# Patient Record
Sex: FEMALE | Race: OTHER | NOT HISPANIC OR LATINO
[De-identification: names, ages, dates, MRNs, and addresses within clinical notes are randomized per-mention and may not be internally consistent; named-entity substitution may affect disease eponyms.]

---

## 2017-01-30 ENCOUNTER — RX RENEWAL (OUTPATIENT)
Age: 42
End: 2017-01-30

## 2017-02-06 ENCOUNTER — RX RENEWAL (OUTPATIENT)
Age: 42
End: 2017-02-06

## 2017-03-20 ENCOUNTER — RX RENEWAL (OUTPATIENT)
Age: 42
End: 2017-03-20

## 2017-05-08 ENCOUNTER — RX RENEWAL (OUTPATIENT)
Age: 42
End: 2017-05-08

## 2017-05-12 ENCOUNTER — EMERGENCY (EMERGENCY)
Facility: HOSPITAL | Age: 42
LOS: 0 days | Discharge: HOME | End: 2017-05-12
Attending: EMERGENCY MEDICINE | Admitting: EMERGENCY MEDICINE
Payer: MEDICAID

## 2017-05-12 VITALS — WEIGHT: 139.99 LBS

## 2017-05-12 VITALS
HEART RATE: 64 BPM | TEMPERATURE: 99 F | OXYGEN SATURATION: 100 % | RESPIRATION RATE: 18 BRPM | SYSTOLIC BLOOD PRESSURE: 107 MMHG | DIASTOLIC BLOOD PRESSURE: 60 MMHG
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
## 2017-05-12 DIAGNOSIS — S93.492A SPRAIN OF OTHER LIGAMENT OF LEFT ANKLE, INITIAL ENCOUNTER: ICD-10-CM

## 2017-05-12 PROCEDURE — 73610 X-RAY EXAM OF ANKLE: CPT | Mod: 26,RT

## 2017-05-12 PROCEDURE — 29505 APPLICATION LONG LEG SPLINT: CPT | Mod: LT

## 2017-05-12 PROCEDURE — 99283 EMERGENCY DEPT VISIT LOW MDM: CPT | Mod: 25

## 2017-05-12 RX ORDER — IBUPROFEN 200 MG
600 TABLET ORAL ONCE
Qty: 0 | Refills: 0 | Status: COMPLETED | OUTPATIENT
Start: 2017-05-12 | End: 2017-05-12

## 2017-05-12 RX ADMIN — Medication 600 MILLIGRAM(S): at 22:19

## 2017-05-12 NOTE — ED ADULT TRIAGE NOTE - CHIEF COMPLAINT QUOTE
Tripped down 3 steps and twisted ankle, states he felt something pop in foot. Pain to left foot and ankle and toes. Denies pain anywhere else. Did not fall or hit head. Took 10mg flexeril PTA.

## 2017-05-12 NOTE — ED STATDOCS - OBJECTIVE STATEMENT
42 y/o female c/o left ankle pain s/p mechanical slip and fall x2 hours ago. Pt states she slipped down 3 steps.

## 2017-05-12 NOTE — ED STATDOCS - NS ED MD SCRIBE ATTENDING SCRIBE SECTIONS
REVIEW OF SYSTEMS/PHYSICAL EXAM/DISPOSITION/RESULTS/PROGRESS NOTE/HISTORY OF PRESENT ILLNESS/PAST MEDICAL/SURGICAL/SOCIAL HISTORY

## 2017-05-12 NOTE — ED STATDOCS - DETAILS:
Attending Contribution to Care: I, Rosario Vásquez, performed the initial face to face bedside interview with this patient regarding history of present illness, review of symptoms and relevant past medical, social and family history.  I completed an independent physical examination.  I was the initial provider who evaluated this patient. I have signed out the follow up of any pending tests (i.e. labs, radiological studies) to the ACP.  I have communicated the patient’s plan of care and disposition with the ACP. I, Rosario Vásquez, performed the initial face to face bedside interview with this patient regarding history of present illness, review of symptoms and relevant past medical, social and family history.  I completed an independent physical examination.  I was the initial provider who evaluated this patient.   The history, relevant review of systems, past medical and surgical history, medical decision making, and physical examination was documented by the scribe in my presence and I attest to the accuracy of the documentation.

## 2017-05-17 ENCOUNTER — APPOINTMENT (OUTPATIENT)
Dept: OBGYN | Facility: CLINIC | Age: 42
End: 2017-05-17

## 2017-05-17 VITALS
WEIGHT: 125 LBS | SYSTOLIC BLOOD PRESSURE: 110 MMHG | BODY MASS INDEX: 20.83 KG/M2 | DIASTOLIC BLOOD PRESSURE: 60 MMHG | HEIGHT: 65 IN

## 2017-05-19 LAB — HPV HIGH+LOW RISK DNA PNL CVX: NEGATIVE

## 2017-05-20 LAB — CYTOLOGY CVX/VAG DOC THIN PREP: NORMAL

## 2017-05-24 ENCOUNTER — FORM ENCOUNTER (OUTPATIENT)
Age: 42
End: 2017-05-24

## 2017-05-25 ENCOUNTER — APPOINTMENT (OUTPATIENT)
Dept: MAMMOGRAPHY | Facility: CLINIC | Age: 42
End: 2017-05-25

## 2017-05-25 ENCOUNTER — OUTPATIENT (OUTPATIENT)
Dept: OUTPATIENT SERVICES | Facility: HOSPITAL | Age: 42
LOS: 1 days | End: 2017-05-25
Payer: MEDICAID

## 2017-05-25 DIAGNOSIS — Z00.8 ENCOUNTER FOR OTHER GENERAL EXAMINATION: ICD-10-CM

## 2017-05-25 PROCEDURE — 77063 BREAST TOMOSYNTHESIS BI: CPT

## 2017-05-25 PROCEDURE — 77067 SCR MAMMO BI INCL CAD: CPT

## 2017-06-05 ENCOUNTER — RESULT REVIEW (OUTPATIENT)
Age: 42
End: 2017-06-05

## 2017-09-28 ENCOUNTER — APPOINTMENT (OUTPATIENT)
Dept: OBGYN | Facility: CLINIC | Age: 42
End: 2017-09-28
Payer: MEDICAID

## 2017-09-28 VITALS
DIASTOLIC BLOOD PRESSURE: 60 MMHG | BODY MASS INDEX: 22.68 KG/M2 | WEIGHT: 128 LBS | HEIGHT: 63 IN | SYSTOLIC BLOOD PRESSURE: 110 MMHG

## 2017-09-28 DIAGNOSIS — N76.2 ACUTE VAGINITIS: ICD-10-CM

## 2017-09-28 DIAGNOSIS — N76.0 ACUTE VAGINITIS: ICD-10-CM

## 2017-09-28 PROCEDURE — 99213 OFFICE O/P EST LOW 20 MIN: CPT

## 2017-09-28 PROCEDURE — 81003 URINALYSIS AUTO W/O SCOPE: CPT | Mod: QW

## 2017-09-28 RX ORDER — ERYTHROMYCIN AND BENZOYL PEROXIDE 3 %-5 %
5-3 KIT TOPICAL DAILY
Qty: 50 | Refills: 4 | Status: DISCONTINUED | COMMUNITY
Start: 2017-05-08 | End: 2017-09-28

## 2017-10-02 ENCOUNTER — APPOINTMENT (OUTPATIENT)
Dept: OBGYN | Facility: CLINIC | Age: 42
End: 2017-10-02
Payer: MEDICAID

## 2017-10-02 VITALS
DIASTOLIC BLOOD PRESSURE: 70 MMHG | HEIGHT: 63 IN | SYSTOLIC BLOOD PRESSURE: 118 MMHG | WEIGHT: 128 LBS | BODY MASS INDEX: 22.68 KG/M2

## 2017-10-02 DIAGNOSIS — Z87.440 PERSONAL HISTORY OF URINARY (TRACT) INFECTIONS: ICD-10-CM

## 2017-10-02 LAB
CANDIDA VAG CYTO: NOT DETECTED
G VAGINALIS+PREV SP MTYP VAG QL MICRO: NOT DETECTED
T VAGINALIS VAG QL WET PREP: NOT DETECTED

## 2017-10-02 PROCEDURE — 81003 URINALYSIS AUTO W/O SCOPE: CPT | Mod: QW

## 2017-10-02 PROCEDURE — 99213 OFFICE O/P EST LOW 20 MIN: CPT

## 2017-10-03 ENCOUNTER — APPOINTMENT (OUTPATIENT)
Dept: OBGYN | Facility: CLINIC | Age: 42
End: 2017-10-03

## 2017-10-03 LAB
APPEARANCE: CLEAR
BILIRUBIN URINE: NEGATIVE
BLOOD URINE: NEGATIVE
COLOR: YELLOW
GLUCOSE QUALITATIVE U: NORMAL MG/DL
KETONES URINE: NEGATIVE
LEUKOCYTE ESTERASE URINE: NEGATIVE
NITRITE URINE: NEGATIVE
PH URINE: 6.5
PROTEIN URINE: NEGATIVE MG/DL
SPECIFIC GRAVITY URINE: 1.01
UROBILINOGEN URINE: NORMAL MG/DL

## 2017-10-04 LAB — BACTERIA UR CULT: NORMAL

## 2017-10-10 ENCOUNTER — APPOINTMENT (OUTPATIENT)
Dept: OBGYN | Facility: CLINIC | Age: 42
End: 2017-10-10
Payer: MEDICAID

## 2017-10-10 VITALS
WEIGHT: 122 LBS | SYSTOLIC BLOOD PRESSURE: 115 MMHG | DIASTOLIC BLOOD PRESSURE: 70 MMHG | BODY MASS INDEX: 21.62 KG/M2 | HEIGHT: 63 IN

## 2017-10-10 PROCEDURE — 99214 OFFICE O/P EST MOD 30 MIN: CPT

## 2017-10-11 ENCOUNTER — RX RENEWAL (OUTPATIENT)
Age: 42
End: 2017-10-11

## 2017-10-11 ENCOUNTER — MESSAGE (OUTPATIENT)
Age: 42
End: 2017-10-11

## 2017-10-11 RX ORDER — NYSTATIN AND TRIAMCINOLONE ACETONIDE 100000; 1 [USP'U]/G; MG/G
100000-0.1 OINTMENT TOPICAL 3 TIMES DAILY
Qty: 21 | Refills: 1 | Status: DISCONTINUED | COMMUNITY
Start: 2017-10-10 | End: 2017-10-11

## 2017-10-11 RX ORDER — TERCONAZOLE 80 MG/1
80 SUPPOSITORY VAGINAL
Qty: 3 | Refills: 0 | Status: DISCONTINUED | COMMUNITY
Start: 2017-10-10 | End: 2017-10-11

## 2017-10-20 ENCOUNTER — APPOINTMENT (OUTPATIENT)
Dept: DERMATOLOGY | Facility: CLINIC | Age: 42
End: 2017-10-20
Payer: MEDICAID

## 2017-10-20 VITALS — BODY MASS INDEX: 21.62 KG/M2 | HEIGHT: 63 IN | WEIGHT: 122 LBS

## 2017-10-20 PROCEDURE — 99213 OFFICE O/P EST LOW 20 MIN: CPT

## 2017-10-20 RX ORDER — CLINDAMYCIN PHOSPHATE 1 G/10ML
1 GEL TOPICAL DAILY
Qty: 30 | Refills: 0 | Status: DISCONTINUED | COMMUNITY
Start: 2017-03-15 | End: 2017-10-20

## 2017-10-20 RX ORDER — NYSTATIN 100000 [USP'U]/G
100000 CREAM TOPICAL TWICE DAILY
Qty: 15 | Refills: 0 | Status: DISCONTINUED | COMMUNITY
Start: 2017-10-11 | End: 2017-10-20

## 2017-10-20 RX ORDER — FLUCONAZOLE 150 MG/1
150 TABLET ORAL
Qty: 1 | Refills: 0 | Status: DISCONTINUED | COMMUNITY
Start: 2017-09-28 | End: 2017-10-20

## 2017-10-20 RX ORDER — TRIAMCINOLONE ACETONIDE 1 MG/G
0.1 CREAM TOPICAL
Qty: 15 | Refills: 0 | Status: DISCONTINUED | COMMUNITY
Start: 2017-10-11 | End: 2017-10-20

## 2017-10-20 RX ORDER — TERCONAZOLE 8 MG/G
0.8 CREAM VAGINAL
Qty: 15 | Refills: 0 | Status: DISCONTINUED | COMMUNITY
Start: 2017-10-11 | End: 2017-10-20

## 2017-11-28 ENCOUNTER — TRANSCRIPTION ENCOUNTER (OUTPATIENT)
Age: 42
End: 2017-11-28

## 2017-12-15 ENCOUNTER — APPOINTMENT (OUTPATIENT)
Dept: DERMATOLOGY | Facility: CLINIC | Age: 42
End: 2017-12-15
Payer: MEDICAID

## 2017-12-15 PROCEDURE — 99213 OFFICE O/P EST LOW 20 MIN: CPT

## 2017-12-15 RX ORDER — SPIRONOLACTONE 50 MG/1
50 TABLET ORAL
Qty: 60 | Refills: 3 | Status: DISCONTINUED | COMMUNITY
Start: 2017-10-20 | End: 2017-12-15

## 2017-12-15 RX ORDER — CLOTRIMAZOLE 2 %
2 CREAM WITH APPLICATOR VAGINAL
Qty: 22 | Refills: 0 | Status: DISCONTINUED | COMMUNITY
Start: 2017-09-30

## 2017-12-15 RX ORDER — SULFAMETHOXAZOLE AND TRIMETHOPRIM 800; 160 MG/1; MG/1
800-160 TABLET ORAL
Qty: 6 | Refills: 0 | Status: DISCONTINUED | COMMUNITY
Start: 2017-10-02

## 2017-12-15 RX ORDER — KETOTIFEN FUMARATE 0.35 MG/ML
0.03 SOLUTION/ DROPS OPHTHALMIC
Qty: 5 | Refills: 0 | Status: ACTIVE | COMMUNITY
Start: 2017-06-26

## 2017-12-31 ENCOUNTER — EMERGENCY (EMERGENCY)
Facility: HOSPITAL | Age: 42
LOS: 0 days | Discharge: HOME | End: 2017-12-31
Attending: EMERGENCY MEDICINE | Admitting: EMERGENCY MEDICINE
Payer: MEDICAID

## 2017-12-31 VITALS
RESPIRATION RATE: 17 BRPM | SYSTOLIC BLOOD PRESSURE: 119 MMHG | TEMPERATURE: 98 F | DIASTOLIC BLOOD PRESSURE: 64 MMHG | HEART RATE: 70 BPM | OXYGEN SATURATION: 100 %

## 2017-12-31 VITALS — WEIGHT: 119.93 LBS

## 2017-12-31 DIAGNOSIS — S80.821A BLISTER (NONTHERMAL), RIGHT LOWER LEG, INITIAL ENCOUNTER: ICD-10-CM
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
## 2017-12-31 PROCEDURE — 99283 EMERGENCY DEPT VISIT LOW MDM: CPT

## 2017-12-31 RX ORDER — HYDROCORTISONE 1 %
1 OINTMENT (GRAM) TOPICAL
Qty: 1 | Refills: 0 | OUTPATIENT
Start: 2017-12-31 | End: 2018-01-09

## 2017-12-31 NOTE — ED STATDOCS - SKIN, MLM
skin normal color for race, warm, dry and intact, except for 1.5cm diameter circular fluid filled blister, no surrounding erythema, TTP. skin normal color for race, warm, dry and intact, except for 1.5cm diameter circular fluid filled blister, no surrounding erythema, fluctuance, induration, TTP.

## 2017-12-31 NOTE — ED STATDOCS - OBJECTIVE STATEMENT
43 y/o F with a PMhx of Lupus, RA presents to the ED c/o what she believes to be a spider bite to her R posterior calf. Pt states that she first noticed a small red monae that later became a blister. Pt states that she has been taking the same Lupus medication for the past x4 years, but has recently started taking Doxycycline. Pt states that she is scheduled to see her rheumatologist next week after the new year. Pt currently calm and denies fever, cough, chills, NVD, abd pain, CP, SOB or any other acute c/o at this time. 41 y/o F with a PMhx of Lupus, RA presents to the ED c/o what she believes to be a spider bite to her R posterior calf. Pt states that she first noticed a small red monae that later became a blister over the past 3-4 days. Pt states that she has been taking the same Lupus medication for the past x4 years, but has recently started taking Doxycycline for facial acnes. Pt states that she is scheduled to see her rheumatologist next week after the new year. Pt currently calm and denies fever, cough, chills, NVD, abd pain, CP, SOB or any other acute c/o at this time. No actual bites, drainage, trauma. No pain or itching at site. No treatment attempted pta.

## 2017-12-31 NOTE — ED ADULT TRIAGE NOTE - CHIEF COMPLAINT QUOTE
pt states she believes she had a spider btie to her right ankle. pt states that 3 days ago she noticed an a blister on her ankle,. with reddness around the area wotj itching and pain. pt states she also has muscle aching the the area and a headache. pt denies fever

## 2017-12-31 NOTE — ED STATDOCS - MEDICAL DECISION MAKING DETAILS
worsening blister to R posterior calf with plans to receive topical cortisone cream worsening blister to R posterior calf with plans to receive topical cortisone cream. no signs of infection. possible related to pts autoimmune disease

## 2017-12-31 NOTE — ED STATDOCS - PROGRESS NOTE DETAILS
42 yr. old female PMH: Lupus, RA presents to ED with blister on right lower leg posterior  aspect calf. No fever or chills no pruritis. No fever or chills. Seen and examined by attending in Intake. Plan: Hydrocortisone ointment. Will F/U with results and re evaluate. Yesenia TOLENTINO

## 2018-01-10 ENCOUNTER — APPOINTMENT (OUTPATIENT)
Dept: OBGYN | Facility: CLINIC | Age: 43
End: 2018-01-10
Payer: MEDICAID

## 2018-01-10 VITALS
RESPIRATION RATE: 16 BRPM | SYSTOLIC BLOOD PRESSURE: 110 MMHG | HEART RATE: 70 BPM | DIASTOLIC BLOOD PRESSURE: 70 MMHG | BODY MASS INDEX: 21.62 KG/M2 | HEIGHT: 63 IN | WEIGHT: 122 LBS

## 2018-01-10 VITALS — TEMPERATURE: 97.5 F

## 2018-01-10 LAB
BILIRUB UR QL STRIP: NEGATIVE
CLARITY UR: CLEAR
COLLECTION METHOD: NORMAL
GLUCOSE UR-MCNC: NEGATIVE
HCG UR QL: 0.2 EU/DL
HGB UR QL STRIP.AUTO: NEGATIVE
KETONES UR-MCNC: NEGATIVE
LEUKOCYTE ESTERASE UR QL STRIP: NEGATIVE
NITRITE UR QL STRIP: NEGATIVE
PH UR STRIP: 6
PROT UR STRIP-MCNC: NEGATIVE
SP GR UR STRIP: 1.01

## 2018-01-10 PROCEDURE — 99213 OFFICE O/P EST LOW 20 MIN: CPT

## 2018-01-10 PROCEDURE — 83986 ASSAY PH BODY FLUID NOS: CPT | Mod: QW

## 2018-01-10 PROCEDURE — 82120 AMINES VAGINAL FLUID QUAL: CPT | Mod: QW

## 2018-01-10 PROCEDURE — 81003 URINALYSIS AUTO W/O SCOPE: CPT | Mod: QW

## 2018-01-11 LAB
APPEARANCE: CLEAR
BACTERIA: NEGATIVE
BILIRUBIN URINE: NEGATIVE
BLOOD URINE: NEGATIVE
C TRACH RRNA SPEC QL NAA+PROBE: NOT DETECTED
CANDIDA VAG CYTO: NOT DETECTED
COLOR: YELLOW
G VAGINALIS+PREV SP MTYP VAG QL MICRO: NOT DETECTED
GLUCOSE QUALITATIVE U: NEGATIVE MG/DL
KETONES URINE: NEGATIVE
LEUKOCYTE ESTERASE URINE: NEGATIVE
MICROSCOPIC-UA: NORMAL
N GONORRHOEA RRNA SPEC QL NAA+PROBE: NOT DETECTED
NITRITE URINE: NEGATIVE
PH URINE: 6
PROTEIN URINE: NEGATIVE MG/DL
RED BLOOD CELLS URINE: 1 /HPF
SOURCE AMPLIFICATION: NORMAL
SPECIFIC GRAVITY URINE: 1.01
SQUAMOUS EPITHELIAL CELLS: 0 /HPF
T VAGINALIS VAG QL WET PREP: NOT DETECTED
UROBILINOGEN URINE: NEGATIVE MG/DL
WHITE BLOOD CELLS URINE: 0 /HPF

## 2018-01-15 ENCOUNTER — CLINICAL ADVICE (OUTPATIENT)
Age: 43
End: 2018-01-15

## 2018-01-15 DIAGNOSIS — R10.2 PELVIC AND PERINEAL PAIN: ICD-10-CM

## 2018-01-15 DIAGNOSIS — G89.29 PELVIC AND PERINEAL PAIN: ICD-10-CM

## 2018-01-15 LAB
BACTERIA GENITAL AEROBE CULT: NORMAL
BACTERIA UR CULT: NORMAL

## 2018-01-16 ENCOUNTER — RESULT REVIEW (OUTPATIENT)
Age: 43
End: 2018-01-16

## 2018-03-09 ENCOUNTER — APPOINTMENT (OUTPATIENT)
Dept: OBGYN | Facility: CLINIC | Age: 43
End: 2018-03-09

## 2018-03-16 ENCOUNTER — APPOINTMENT (OUTPATIENT)
Dept: DERMATOLOGY | Facility: CLINIC | Age: 43
End: 2018-03-16
Payer: MEDICAID

## 2018-03-16 PROCEDURE — 99213 OFFICE O/P EST LOW 20 MIN: CPT

## 2018-03-16 PROCEDURE — 0029D: CPT

## 2018-03-16 RX ORDER — DOXYCYCLINE 100 MG/1
100 CAPSULE ORAL TWICE DAILY
Qty: 1 | Refills: 3 | Status: DISCONTINUED | COMMUNITY
Start: 2017-12-15 | End: 2018-03-16

## 2018-03-16 RX ORDER — METRONIDAZOLE 7.5 MG/G
0.75 GEL VAGINAL
Qty: 15 | Refills: 0 | Status: DISCONTINUED | COMMUNITY
Start: 2018-01-10 | End: 2018-03-16

## 2018-03-16 RX ORDER — CYCLOBENZAPRINE HYDROCHLORIDE 10 MG/1
10 TABLET, FILM COATED ORAL
Qty: 30 | Refills: 0 | Status: DISCONTINUED | COMMUNITY
Start: 2017-08-22 | End: 2018-03-16

## 2018-06-03 ENCOUNTER — FORM ENCOUNTER (OUTPATIENT)
Age: 43
End: 2018-06-03

## 2018-06-04 ENCOUNTER — OUTPATIENT (OUTPATIENT)
Dept: OUTPATIENT SERVICES | Facility: HOSPITAL | Age: 43
LOS: 1 days | End: 2018-06-04
Payer: MEDICAID

## 2018-06-04 ENCOUNTER — APPOINTMENT (OUTPATIENT)
Dept: MAMMOGRAPHY | Facility: CLINIC | Age: 43
End: 2018-06-04
Payer: MEDICAID

## 2018-06-04 DIAGNOSIS — Z00.8 ENCOUNTER FOR OTHER GENERAL EXAMINATION: ICD-10-CM

## 2018-06-04 PROCEDURE — 77063 BREAST TOMOSYNTHESIS BI: CPT | Mod: 26

## 2018-06-04 PROCEDURE — 77063 BREAST TOMOSYNTHESIS BI: CPT

## 2018-06-04 PROCEDURE — 77067 SCR MAMMO BI INCL CAD: CPT | Mod: 26

## 2018-06-04 PROCEDURE — 77067 SCR MAMMO BI INCL CAD: CPT

## 2018-06-25 ENCOUNTER — APPOINTMENT (OUTPATIENT)
Dept: DERMATOLOGY | Facility: CLINIC | Age: 43
End: 2018-06-25
Payer: MEDICAID

## 2018-06-25 PROCEDURE — 99213 OFFICE O/P EST LOW 20 MIN: CPT

## 2018-06-25 RX ORDER — LIDOCAINE 5% 700 MG/1
5 PATCH TOPICAL
Qty: 30 | Refills: 0 | Status: ACTIVE | COMMUNITY
Start: 2018-05-31

## 2018-06-25 RX ORDER — HYDROCORTISONE 1 G/100G
1 GEL TOPICAL
Refills: 0 | Status: DISCONTINUED | COMMUNITY
End: 2018-06-25

## 2018-09-11 ENCOUNTER — RX RENEWAL (OUTPATIENT)
Age: 43
End: 2018-09-11

## 2018-09-17 ENCOUNTER — RX RENEWAL (OUTPATIENT)
Age: 43
End: 2018-09-17

## 2018-09-24 ENCOUNTER — APPOINTMENT (OUTPATIENT)
Dept: OBGYN | Facility: CLINIC | Age: 43
End: 2018-09-24
Payer: MEDICAID

## 2018-09-24 VITALS
SYSTOLIC BLOOD PRESSURE: 100 MMHG | HEIGHT: 63 IN | DIASTOLIC BLOOD PRESSURE: 60 MMHG | BODY MASS INDEX: 22.68 KG/M2 | WEIGHT: 128 LBS

## 2018-09-24 DIAGNOSIS — Z01.419 ENCOUNTER FOR GYNECOLOGICAL EXAMINATION (GENERAL) (ROUTINE) W/OUT ABNORMAL FINDINGS: ICD-10-CM

## 2018-09-24 PROCEDURE — 82270 OCCULT BLOOD FECES: CPT

## 2018-09-24 PROCEDURE — 99396 PREV VISIT EST AGE 40-64: CPT

## 2018-09-25 PROBLEM — Z01.419 WOMEN'S ANNUAL ROUTINE GYNECOLOGICAL EXAMINATION: Status: RESOLVED | Noted: 2018-09-24 | Resolved: 2018-09-25

## 2018-10-19 LAB
C TRACH RRNA SPEC QL NAA+PROBE: NOT DETECTED
CYTOLOGY CVX/VAG DOC THIN PREP: NORMAL
HPV HIGH+LOW RISK DNA PNL CVX: NOT DETECTED
N GONORRHOEA RRNA SPEC QL NAA+PROBE: NOT DETECTED
SOURCE TP AMPLIFICATION: NORMAL

## 2018-11-26 ENCOUNTER — APPOINTMENT (OUTPATIENT)
Dept: DERMATOLOGY | Facility: CLINIC | Age: 43
End: 2018-11-26
Payer: MEDICAID

## 2018-11-26 VITALS — HEIGHT: 63 IN | WEIGHT: 126 LBS | BODY MASS INDEX: 22.32 KG/M2

## 2018-11-26 PROCEDURE — 99213 OFFICE O/P EST LOW 20 MIN: CPT

## 2018-11-26 RX ORDER — CYCLOSPORINE 0.5 MG/ML
0.05 EMULSION OPHTHALMIC
Qty: 60 | Refills: 0 | Status: DISCONTINUED | COMMUNITY
Start: 2017-03-11 | End: 2018-11-26

## 2018-11-26 RX ORDER — ADHESIVE TAPE 3"X 2.3 YD
50 MCG TAPE, NON-MEDICATED TOPICAL
Refills: 0 | Status: ACTIVE | COMMUNITY

## 2018-11-26 RX ORDER — ACETAMINOPHEN 325 MG
TABLET ORAL
Refills: 0 | Status: ACTIVE | COMMUNITY

## 2018-11-26 RX ORDER — CYCLOBENZAPRINE HYDROCHLORIDE 5 MG/1
5 TABLET, FILM COATED ORAL
Qty: 30 | Refills: 0 | Status: DISCONTINUED | COMMUNITY
Start: 2017-07-10 | End: 2018-11-26

## 2018-11-26 RX ORDER — HYDROCORTISONE AND ACETIC ACID OTIC 20.75; 10.375 MG/ML; MG/ML
1-2 SOLUTION AURICULAR (OTIC)
Qty: 10 | Refills: 0 | Status: DISCONTINUED | COMMUNITY
Start: 2018-03-08 | End: 2018-11-26

## 2018-11-27 PROBLEM — L93.0 DISCOID LUPUS ERYTHEMATOSUS: Chronic | Status: ACTIVE | Noted: 2017-12-31

## 2018-11-27 PROBLEM — M06.9 RHEUMATOID ARTHRITIS, UNSPECIFIED: Chronic | Status: ACTIVE | Noted: 2017-12-31

## 2018-12-12 RX ORDER — DAPSONE 50 MG/G
5 GEL TOPICAL
Qty: 1 | Refills: 2 | Status: DISCONTINUED | COMMUNITY
Start: 2018-11-26 | End: 2018-12-12

## 2019-01-06 ENCOUNTER — EMERGENCY (EMERGENCY)
Facility: HOSPITAL | Age: 44
LOS: 0 days | Discharge: HOME | End: 2019-01-06
Attending: EMERGENCY MEDICINE | Admitting: EMERGENCY MEDICINE
Payer: MEDICAID

## 2019-01-06 VITALS
OXYGEN SATURATION: 100 % | RESPIRATION RATE: 18 BRPM | HEART RATE: 82 BPM | SYSTOLIC BLOOD PRESSURE: 124 MMHG | TEMPERATURE: 98 F | DIASTOLIC BLOOD PRESSURE: 74 MMHG

## 2019-01-06 VITALS — WEIGHT: 126.1 LBS | HEIGHT: 63 IN

## 2019-01-06 LAB
ALBUMIN SERPL ELPH-MCNC: 4 G/DL — SIGNIFICANT CHANGE UP (ref 3.3–5)
ALP SERPL-CCNC: 56 U/L — SIGNIFICANT CHANGE UP (ref 40–120)
ALT FLD-CCNC: 29 U/L — SIGNIFICANT CHANGE UP (ref 12–78)
ANION GAP SERPL CALC-SCNC: 6 MMOL/L — SIGNIFICANT CHANGE UP (ref 5–17)
APPEARANCE UR: CLEAR — SIGNIFICANT CHANGE UP
AST SERPL-CCNC: 12 U/L — LOW (ref 15–37)
BACTERIA # UR AUTO: ABNORMAL
BASOPHILS # BLD AUTO: 0.04 K/UL — SIGNIFICANT CHANGE UP (ref 0–0.2)
BASOPHILS NFR BLD AUTO: 0.7 % — SIGNIFICANT CHANGE UP (ref 0–2)
BILIRUB SERPL-MCNC: 0.5 MG/DL — SIGNIFICANT CHANGE UP (ref 0.2–1.2)
BILIRUB UR-MCNC: NEGATIVE — SIGNIFICANT CHANGE UP
BUN SERPL-MCNC: 18 MG/DL — SIGNIFICANT CHANGE UP (ref 7–23)
CALCIUM SERPL-MCNC: 8.7 MG/DL — SIGNIFICANT CHANGE UP (ref 8.5–10.1)
CHLORIDE SERPL-SCNC: 109 MMOL/L — HIGH (ref 96–108)
CO2 SERPL-SCNC: 27 MMOL/L — SIGNIFICANT CHANGE UP (ref 22–31)
COLOR SPEC: YELLOW — SIGNIFICANT CHANGE UP
CREAT SERPL-MCNC: 0.72 MG/DL — SIGNIFICANT CHANGE UP (ref 0.5–1.3)
DIFF PNL FLD: NEGATIVE — SIGNIFICANT CHANGE UP
EOSINOPHIL # BLD AUTO: 0.11 K/UL — SIGNIFICANT CHANGE UP (ref 0–0.5)
EOSINOPHIL NFR BLD AUTO: 2 % — SIGNIFICANT CHANGE UP (ref 0–6)
EPI CELLS # UR: SIGNIFICANT CHANGE UP
GLUCOSE SERPL-MCNC: 86 MG/DL — SIGNIFICANT CHANGE UP (ref 70–99)
GLUCOSE UR QL: NEGATIVE MG/DL — SIGNIFICANT CHANGE UP
HCT VFR BLD CALC: 39.3 % — SIGNIFICANT CHANGE UP (ref 34.5–45)
HGB BLD-MCNC: 13.4 G/DL — SIGNIFICANT CHANGE UP (ref 11.5–15.5)
IMM GRANULOCYTES NFR BLD AUTO: 0.2 % — SIGNIFICANT CHANGE UP (ref 0–1.5)
KETONES UR-MCNC: NEGATIVE — SIGNIFICANT CHANGE UP
LEUKOCYTE ESTERASE UR-ACNC: ABNORMAL
LIDOCAIN IGE QN: 136 U/L — SIGNIFICANT CHANGE UP (ref 73–393)
LYMPHOCYTES # BLD AUTO: 1.38 K/UL — SIGNIFICANT CHANGE UP (ref 1–3.3)
LYMPHOCYTES # BLD AUTO: 24.6 % — SIGNIFICANT CHANGE UP (ref 13–44)
MAGNESIUM SERPL-MCNC: 2.1 MG/DL — SIGNIFICANT CHANGE UP (ref 1.6–2.6)
MCHC RBC-ENTMCNC: 31.5 PG — SIGNIFICANT CHANGE UP (ref 27–34)
MCHC RBC-ENTMCNC: 34.1 GM/DL — SIGNIFICANT CHANGE UP (ref 32–36)
MCV RBC AUTO: 92.5 FL — SIGNIFICANT CHANGE UP (ref 80–100)
MONOCYTES # BLD AUTO: 0.41 K/UL — SIGNIFICANT CHANGE UP (ref 0–0.9)
MONOCYTES NFR BLD AUTO: 7.3 % — SIGNIFICANT CHANGE UP (ref 2–14)
NEUTROPHILS # BLD AUTO: 3.65 K/UL — SIGNIFICANT CHANGE UP (ref 1.8–7.4)
NEUTROPHILS NFR BLD AUTO: 65.2 % — SIGNIFICANT CHANGE UP (ref 43–77)
NITRITE UR-MCNC: NEGATIVE — SIGNIFICANT CHANGE UP
NRBC # BLD: 0 /100 WBCS — SIGNIFICANT CHANGE UP (ref 0–0)
PH UR: 5 — SIGNIFICANT CHANGE UP (ref 5–8)
PHOSPHATE SERPL-MCNC: 3.4 MG/DL — SIGNIFICANT CHANGE UP (ref 2.5–4.5)
PLATELET # BLD AUTO: 167 K/UL — SIGNIFICANT CHANGE UP (ref 150–400)
POTASSIUM SERPL-MCNC: 4.2 MMOL/L — SIGNIFICANT CHANGE UP (ref 3.5–5.3)
POTASSIUM SERPL-SCNC: 4.2 MMOL/L — SIGNIFICANT CHANGE UP (ref 3.5–5.3)
PROT SERPL-MCNC: 7.7 GM/DL — SIGNIFICANT CHANGE UP (ref 6–8.3)
PROT UR-MCNC: 15 MG/DL
RBC # BLD: 4.25 M/UL — SIGNIFICANT CHANGE UP (ref 3.8–5.2)
RBC # FLD: 12.1 % — SIGNIFICANT CHANGE UP (ref 10.3–14.5)
RBC CASTS # UR COMP ASSIST: NEGATIVE /HPF — SIGNIFICANT CHANGE UP (ref 0–4)
SODIUM SERPL-SCNC: 142 MMOL/L — SIGNIFICANT CHANGE UP (ref 135–145)
SP GR SPEC: 1.03 — HIGH (ref 1.01–1.02)
TROPONIN I SERPL-MCNC: <0.015 NG/ML — SIGNIFICANT CHANGE UP (ref 0.01–0.04)
TROPONIN I SERPL-MCNC: <0.015 NG/ML — SIGNIFICANT CHANGE UP (ref 0.01–0.04)
UROBILINOGEN FLD QL: NEGATIVE MG/DL — SIGNIFICANT CHANGE UP
WBC # BLD: 5.6 K/UL — SIGNIFICANT CHANGE UP (ref 3.8–10.5)
WBC # FLD AUTO: 5.6 K/UL — SIGNIFICANT CHANGE UP (ref 3.8–10.5)
WBC UR QL: SIGNIFICANT CHANGE UP

## 2019-01-06 PROCEDURE — 93010 ELECTROCARDIOGRAM REPORT: CPT

## 2019-01-06 PROCEDURE — 99284 EMERGENCY DEPT VISIT MOD MDM: CPT

## 2019-01-06 PROCEDURE — 71045 X-RAY EXAM CHEST 1 VIEW: CPT | Mod: 26

## 2019-01-06 PROCEDURE — 76700 US EXAM ABDOM COMPLETE: CPT | Mod: 26

## 2019-01-06 PROCEDURE — 76857 US EXAM PELVIC LIMITED: CPT | Mod: 26

## 2019-01-06 RX ORDER — LIDOCAINE 4 G/100G
5 CREAM TOPICAL ONCE
Qty: 0 | Refills: 0 | Status: COMPLETED | OUTPATIENT
Start: 2019-01-06 | End: 2019-01-06

## 2019-01-06 RX ORDER — SODIUM CHLORIDE 9 MG/ML
1000 INJECTION INTRAMUSCULAR; INTRAVENOUS; SUBCUTANEOUS ONCE
Qty: 0 | Refills: 0 | Status: COMPLETED | OUTPATIENT
Start: 2019-01-06 | End: 2019-01-06

## 2019-01-06 RX ADMIN — SODIUM CHLORIDE 2000 MILLILITER(S): 9 INJECTION INTRAMUSCULAR; INTRAVENOUS; SUBCUTANEOUS at 18:10

## 2019-01-06 RX ADMIN — SODIUM CHLORIDE 1000 MILLILITER(S): 9 INJECTION INTRAMUSCULAR; INTRAVENOUS; SUBCUTANEOUS at 19:10

## 2019-01-06 NOTE — ED STATDOCS - ATTENDING CONTRIBUTION TO CARE
I, Abdiel Ramsey, performed the initial face to face bedside interview with this patient regarding history of present illness, review of symptoms and relevant past medical, social and family history.  I completed an independent physical examination.  I was the initial provider who evaluated this patient. I have signed out the follow up of any pending tests (i.e. labs, radiological studies) to the ACP.  I have communicated the patient’s plan of care and disposition with the ACP.  The history, relevant review of systems, past medical and surgical history, medical decision making, and physical examination was documented by the scribe in my presence and I attest to the accuracy of the documentation.

## 2019-01-06 NOTE — ED STATDOCS - CARE PROVIDER_API CALL
Bear Chapman), Gastroenterology; Internal Medicine  [address] [place] [address]  Phone: [phone]  Fax: [phone]

## 2019-01-06 NOTE — ED STATDOCS - OBJECTIVE STATEMENT
44 y/o female with a PMHx of Lupus on Plaquenil, RA, H. Pylori presents to the ED c/o RUQ abd pain x3-4 days. Pt describes the pain as 8/10 in severity, constant, sharp and radiating to lower abdomen. Denies nausea, vomiting, urinary complaints, bloody stools/melena. Pt has hx of constipation in December 2018, took Miralax with no relief. Pt then tried detox tea which improved her sx, now with normal BMs and gradual onset of abd pain 2 days later. No hx of abd surgeries. Last BM 2 days ago. Pt reports similar pain as previous H. Pylori infection. Pt states she has CP at baseline. LNMP end of Dec 2018. Rheumatologist Dr. Villa. PCP Dr. Chan. GI Dr. Pond

## 2019-01-06 NOTE — ED STATDOCS - PROGRESS NOTE DETAILS
42 y/o F with PMH of lupus, H. pylori presents with RUQ pain x 3-4 days which has been getting progressively worse. States pain is similar to when she had H. pylori in the past. Follows Dr. Devine for GI. Denies nausea, vomiting, diarrhea. Admits to chronic constipation, treated with PO laxatives at [facility]. 44 y/o F with PMH of lupus, H. pylori presents with RUQ pain x 3-4 days which has been getting progressively worse. States pain is similar to when she had H. pylori in the past. Follows Dr. Devine for GI. Denies nausea, vomiting, diarrhea. Admits to chronic constipation, treated with PO laxatives at home. PE: Well appearing. Cardiac: s1/s2, RRR. Lungs: CTAB. Abdomen: NBS x4, soft, nontender. A/P: r/o cholecystitis, pancreatitis. labs, CT abdomen/pelvis, RUQ US. IVF. Pt advised if eval unremarkable to follow up with GI for endoscopy. - Tomas Ferris PA-C Labs and imaging reviewed d/w patient. Plan to check 2nd troponin, will d/c home if normal. - Tomas Ferris PA-C Trop #2 negative. Plan to dc with GI follow up. - Tomas Ferris PA-C

## 2019-01-08 DIAGNOSIS — M32.9 SYSTEMIC LUPUS ERYTHEMATOSUS, UNSPECIFIED: ICD-10-CM

## 2019-01-08 DIAGNOSIS — K29.00 ACUTE GASTRITIS WITHOUT BLEEDING: ICD-10-CM

## 2019-01-08 DIAGNOSIS — R10.11 RIGHT UPPER QUADRANT PAIN: ICD-10-CM

## 2019-01-08 DIAGNOSIS — M06.9 RHEUMATOID ARTHRITIS, UNSPECIFIED: ICD-10-CM

## 2019-03-18 ENCOUNTER — APPOINTMENT (OUTPATIENT)
Dept: DERMATOLOGY | Facility: CLINIC | Age: 44
End: 2019-03-18
Payer: MEDICAID

## 2019-03-18 VITALS — BODY MASS INDEX: 22.32 KG/M2 | HEIGHT: 63 IN | WEIGHT: 126 LBS

## 2019-03-18 PROCEDURE — 99213 OFFICE O/P EST LOW 20 MIN: CPT

## 2019-03-18 NOTE — PHYSICAL EXAM
[FreeTextEntry3] : Skin examination performed of the face, neck, chest, hands, lower legs;\par The patient is well, alert and oriented, pleasant and cooperative.\par Eyelids, conjunctivae, oral mucosa, digits and nails all normal.  \par No cervical adenopathy.\par \par \par acneiform papules on jawline, L > R

## 2019-03-18 NOTE — ASSESSMENT
[FreeTextEntry1] : acne;  cont aczone/tretinoin;\par f/u for TCA q 2-3 months;\par used glyMask, po txs already without good effect;\par \par

## 2019-04-21 ENCOUNTER — EMERGENCY (EMERGENCY)
Facility: HOSPITAL | Age: 44
LOS: 0 days | Discharge: HOME | End: 2019-04-21
Attending: EMERGENCY MEDICINE | Admitting: EMERGENCY MEDICINE
Payer: MEDICAID

## 2019-04-21 VITALS
DIASTOLIC BLOOD PRESSURE: 67 MMHG | TEMPERATURE: 99 F | HEART RATE: 70 BPM | RESPIRATION RATE: 18 BRPM | SYSTOLIC BLOOD PRESSURE: 116 MMHG | OXYGEN SATURATION: 98 %

## 2019-04-21 VITALS — WEIGHT: 134.04 LBS

## 2019-04-21 DIAGNOSIS — M32.9 SYSTEMIC LUPUS ERYTHEMATOSUS, UNSPECIFIED: ICD-10-CM

## 2019-04-21 DIAGNOSIS — M06.9 RHEUMATOID ARTHRITIS, UNSPECIFIED: ICD-10-CM

## 2019-04-21 DIAGNOSIS — Z88.5 ALLERGY STATUS TO NARCOTIC AGENT: ICD-10-CM

## 2019-04-21 DIAGNOSIS — J06.9 ACUTE UPPER RESPIRATORY INFECTION, UNSPECIFIED: ICD-10-CM

## 2019-04-21 DIAGNOSIS — R50.9 FEVER, UNSPECIFIED: ICD-10-CM

## 2019-04-21 DIAGNOSIS — M54.31 SCIATICA, RIGHT SIDE: ICD-10-CM

## 2019-04-21 DIAGNOSIS — R07.89 OTHER CHEST PAIN: ICD-10-CM

## 2019-04-21 LAB
ALBUMIN SERPL ELPH-MCNC: 4 G/DL — SIGNIFICANT CHANGE UP (ref 3.3–5)
ALP SERPL-CCNC: 57 U/L — SIGNIFICANT CHANGE UP (ref 40–120)
ALT FLD-CCNC: 22 U/L — SIGNIFICANT CHANGE UP (ref 12–78)
ANION GAP SERPL CALC-SCNC: 7 MMOL/L — SIGNIFICANT CHANGE UP (ref 5–17)
APPEARANCE UR: CLEAR — SIGNIFICANT CHANGE UP
AST SERPL-CCNC: 9 U/L — LOW (ref 15–37)
BASOPHILS # BLD AUTO: 0.02 K/UL — SIGNIFICANT CHANGE UP (ref 0–0.2)
BASOPHILS NFR BLD AUTO: 0.4 % — SIGNIFICANT CHANGE UP (ref 0–2)
BILIRUB SERPL-MCNC: 0.4 MG/DL — SIGNIFICANT CHANGE UP (ref 0.2–1.2)
BILIRUB UR-MCNC: NEGATIVE — SIGNIFICANT CHANGE UP
BUN SERPL-MCNC: 14 MG/DL — SIGNIFICANT CHANGE UP (ref 7–23)
CALCIUM SERPL-MCNC: 8.3 MG/DL — LOW (ref 8.5–10.1)
CHLORIDE SERPL-SCNC: 107 MMOL/L — SIGNIFICANT CHANGE UP (ref 96–108)
CO2 SERPL-SCNC: 25 MMOL/L — SIGNIFICANT CHANGE UP (ref 22–31)
COLOR SPEC: YELLOW — SIGNIFICANT CHANGE UP
CREAT SERPL-MCNC: 0.72 MG/DL — SIGNIFICANT CHANGE UP (ref 0.5–1.3)
DIFF PNL FLD: NEGATIVE — SIGNIFICANT CHANGE UP
EOSINOPHIL # BLD AUTO: 0.05 K/UL — SIGNIFICANT CHANGE UP (ref 0–0.5)
EOSINOPHIL NFR BLD AUTO: 1 % — SIGNIFICANT CHANGE UP (ref 0–6)
GLUCOSE SERPL-MCNC: 101 MG/DL — HIGH (ref 70–99)
GLUCOSE UR QL: NEGATIVE MG/DL — SIGNIFICANT CHANGE UP
HCG SERPL-ACNC: <1 MIU/ML — SIGNIFICANT CHANGE UP
HCT VFR BLD CALC: 39.3 % — SIGNIFICANT CHANGE UP (ref 34.5–45)
HGB BLD-MCNC: 13.3 G/DL — SIGNIFICANT CHANGE UP (ref 11.5–15.5)
IMM GRANULOCYTES NFR BLD AUTO: 0.2 % — SIGNIFICANT CHANGE UP (ref 0–1.5)
KETONES UR-MCNC: NEGATIVE — SIGNIFICANT CHANGE UP
LACTATE SERPL-SCNC: 1.1 MMOL/L — SIGNIFICANT CHANGE UP (ref 0.7–2)
LEUKOCYTE ESTERASE UR-ACNC: NEGATIVE — SIGNIFICANT CHANGE UP
LYMPHOCYTES # BLD AUTO: 0.27 K/UL — LOW (ref 1–3.3)
LYMPHOCYTES # BLD AUTO: 5.3 % — LOW (ref 13–44)
MANUAL SMEAR VERIFICATION: SIGNIFICANT CHANGE UP
MCHC RBC-ENTMCNC: 30.7 PG — SIGNIFICANT CHANGE UP (ref 27–34)
MCHC RBC-ENTMCNC: 33.8 GM/DL — SIGNIFICANT CHANGE UP (ref 32–36)
MCV RBC AUTO: 90.8 FL — SIGNIFICANT CHANGE UP (ref 80–100)
MONOCYTES # BLD AUTO: 0.54 K/UL — SIGNIFICANT CHANGE UP (ref 0–0.9)
MONOCYTES NFR BLD AUTO: 10.5 % — SIGNIFICANT CHANGE UP (ref 2–14)
NEUTROPHILS # BLD AUTO: 4.23 K/UL — SIGNIFICANT CHANGE UP (ref 1.8–7.4)
NEUTROPHILS NFR BLD AUTO: 82.6 % — HIGH (ref 43–77)
NITRITE UR-MCNC: NEGATIVE — SIGNIFICANT CHANGE UP
NRBC # BLD: 0 /100 WBCS — SIGNIFICANT CHANGE UP (ref 0–0)
PH UR: 6 — SIGNIFICANT CHANGE UP (ref 5–8)
PLAT MORPH BLD: NORMAL — SIGNIFICANT CHANGE UP
PLATELET # BLD AUTO: 148 K/UL — LOW (ref 150–400)
PLATELET COUNT - ESTIMATE: NORMAL — SIGNIFICANT CHANGE UP
POTASSIUM SERPL-MCNC: 3.8 MMOL/L — SIGNIFICANT CHANGE UP (ref 3.5–5.3)
POTASSIUM SERPL-SCNC: 3.8 MMOL/L — SIGNIFICANT CHANGE UP (ref 3.5–5.3)
PROT SERPL-MCNC: 7.5 GM/DL — SIGNIFICANT CHANGE UP (ref 6–8.3)
PROT UR-MCNC: NEGATIVE MG/DL — SIGNIFICANT CHANGE UP
RAPID RVP RESULT: DETECTED
RBC # BLD: 4.33 M/UL — SIGNIFICANT CHANGE UP (ref 3.8–5.2)
RBC # FLD: 12.2 % — SIGNIFICANT CHANGE UP (ref 10.3–14.5)
RBC BLD AUTO: NORMAL — SIGNIFICANT CHANGE UP
RV+EV RNA SPEC QL NAA+PROBE: DETECTED
SODIUM SERPL-SCNC: 139 MMOL/L — SIGNIFICANT CHANGE UP (ref 135–145)
SP GR SPEC: 1.01 — SIGNIFICANT CHANGE UP (ref 1.01–1.02)
TROPONIN I SERPL-MCNC: <0.015 NG/ML — SIGNIFICANT CHANGE UP (ref 0.01–0.04)
TROPONIN I SERPL-MCNC: <0.015 NG/ML — SIGNIFICANT CHANGE UP (ref 0.01–0.04)
UROBILINOGEN FLD QL: NEGATIVE MG/DL — SIGNIFICANT CHANGE UP
WBC # BLD: 5.12 K/UL — SIGNIFICANT CHANGE UP (ref 3.8–10.5)
WBC # FLD AUTO: 5.12 K/UL — SIGNIFICANT CHANGE UP (ref 3.8–10.5)

## 2019-04-21 PROCEDURE — 93010 ELECTROCARDIOGRAM REPORT: CPT

## 2019-04-21 PROCEDURE — 71275 CT ANGIOGRAPHY CHEST: CPT | Mod: 26

## 2019-04-21 PROCEDURE — 99284 EMERGENCY DEPT VISIT MOD MDM: CPT | Mod: 25

## 2019-04-21 PROCEDURE — 71046 X-RAY EXAM CHEST 2 VIEWS: CPT | Mod: 26

## 2019-04-21 RX ORDER — ACETAMINOPHEN 500 MG
1000 TABLET ORAL ONCE
Qty: 0 | Refills: 0 | Status: COMPLETED | OUTPATIENT
Start: 2019-04-21 | End: 2019-04-21

## 2019-04-21 RX ORDER — SODIUM CHLORIDE 9 MG/ML
1000 INJECTION INTRAMUSCULAR; INTRAVENOUS; SUBCUTANEOUS ONCE
Qty: 0 | Refills: 0 | Status: COMPLETED | OUTPATIENT
Start: 2019-04-21 | End: 2019-04-21

## 2019-04-21 RX ORDER — AZITHROMYCIN 500 MG/1
500 TABLET, FILM COATED ORAL ONCE
Qty: 0 | Refills: 0 | Status: COMPLETED | OUTPATIENT
Start: 2019-04-21 | End: 2019-04-21

## 2019-04-21 RX ORDER — BENZOCAINE AND MENTHOL 5; 1 G/100ML; G/100ML
1 LIQUID ORAL ONCE
Qty: 0 | Refills: 0 | Status: COMPLETED | OUTPATIENT
Start: 2019-04-21 | End: 2019-04-21

## 2019-04-21 RX ORDER — AZITHROMYCIN 500 MG/1
1 TABLET, FILM COATED ORAL
Qty: 4 | Refills: 0 | OUTPATIENT
Start: 2019-04-21 | End: 2019-04-24

## 2019-04-21 RX ADMIN — SODIUM CHLORIDE 2000 MILLILITER(S): 9 INJECTION INTRAMUSCULAR; INTRAVENOUS; SUBCUTANEOUS at 20:19

## 2019-04-21 RX ADMIN — AZITHROMYCIN 500 MILLIGRAM(S): 500 TABLET, FILM COATED ORAL at 23:18

## 2019-04-21 RX ADMIN — BENZOCAINE AND MENTHOL 1 LOZENGE: 5; 1 LIQUID ORAL at 23:18

## 2019-04-21 RX ADMIN — Medication 1000 MILLIGRAM(S): at 21:00

## 2019-04-21 RX ADMIN — Medication 1000 MILLIGRAM(S): at 20:27

## 2019-04-21 RX ADMIN — SODIUM CHLORIDE 1000 MILLILITER(S): 9 INJECTION INTRAMUSCULAR; INTRAVENOUS; SUBCUTANEOUS at 21:19

## 2019-04-21 NOTE — ED STATDOCS - NS_ ATTENDINGSCRIBEDETAILS _ED_A_ED_FT
The scribe's documentation has been prepared under my direction and personally reviewed by me in its entirety. I confirm that the note above accurately reflects all work, treatment, procedures, and medical decision-making performed by me.  Balaji Duarte MD

## 2019-04-21 NOTE — ED PROVIDER NOTE - NEUROLOGICAL, MLM
Alert and oriented, no focal deficits, no motor or sensory deficits. Alert and oriented, no focal deficits, no motor or sensory deficits. CNs 2-12 grossly intact. No saddle anesthesia. 2+ pulses in bilateral radial and dorsalis pedis arteries.

## 2019-04-21 NOTE — ED PROVIDER NOTE - CARE PLAN
Principal Discharge DX:	Upper respiratory tract infection, unspecified type Principal Discharge DX:	Upper respiratory tract infection, unspecified type  Secondary Diagnosis:	Sciatica, unspecified laterality

## 2019-04-21 NOTE — ED PROVIDER NOTE - NS_ ATTENDINGSCRIBEDETAILS _ED_A_ED_FT
I Gaston Angel MD saw and examined the patient. Scribe documented for me and under my supervision. I have modified the scribe's documentation where necessary to reflect my history, physical exam and other relevant documentations pertinent to the care of the patient.

## 2019-04-21 NOTE — ED PROVIDER NOTE - OBJECTIVE STATEMENT
42 y/o female with a PMHx of Lupus, RA, Costochondritis, Sciatica presents to the ED c/o left sided peristernal chest pain x2 days. Pt has a Tmax of 102 measured orally this morning. For the past week, she has felt palpitations and a sharp pain in the rib cage area. Pain worsens with respiration. Pt has cough, congestion and sore throat. States today she developed worsening chest pain, HA, bilateral leg pain. States she has right leg sciatica, but the pain in her right leg today feels differently. Reports she is unable to walk because of pain. States no swelling in LE. Took ibuprofen and cough syrup today. On Plaquenil. Pathologist: Dr. Ordaz. 42 y/o female with a PMHx of Lupus, RA, Costochondritis, Sciatica presents to the ED c/o left sided peristernal chest pain x2 days. Pt has a Tmax of 102 F measured orally this morning. For the past week, she has felt palpitations and a sharp pain in the rib cage area. Pain worsens with respiration. Pt has cough, congestion and sore throat. States today she developed worsening chest pain, HA, bilateral leg pain. States she has right leg sciatica. No saddle anesthesia, no paresthesias, no and no incontinence or urinary retention.  States no swelling in LE. Took ibuprofen and cough syrup today. On Plaquenil. Pathologist: Dr. Ordaz.

## 2019-04-21 NOTE — ED STATDOCS - PROGRESS NOTE DETAILS
Rahul NP for Dr. Duarte: 42 y/o female with a PMHx of Lupus, RA presents to the ED c/o fever for the past few days. Pt states for the past few days she has had some minor chest pain. States today she developed worsening chest pain, HA, bilateral leg pain. States she has right leg sciatica but pain is in her right leg today is different. Took ibuprofen and cough syrup today. Fever broke today. Reports she is unable to walk because of the pain. Rahul NP for Dr. Duarte: 44 y/o female with a PMHx of Lupus, RA presents to the ED c/o fever for the past few days. Pt states for the past few days she has had some minor chest pain. States today she developed worsening chest pain, HA, bilateral leg pain. States she has right leg sciatica, but pain is in her right leg today is different. Took ibuprofen and cough syrup today. Fever broke today. Reports she is unable to walk because of the pain. Will send pt to main ED for further evaluation.

## 2019-04-21 NOTE — ED ADULT NURSE REASSESSMENT NOTE - NS ED NURSE REASSESS COMMENT FT1
Assumed care of patient from Lorena PACHECO. No signs of acute distress noted. Patient resting comfortably. Will continue to monitor.

## 2019-04-22 LAB
CULTURE RESULTS: NO GROWTH — SIGNIFICANT CHANGE UP
SPECIMEN SOURCE: SIGNIFICANT CHANGE UP

## 2019-04-24 ENCOUNTER — TRANSCRIPTION ENCOUNTER (OUTPATIENT)
Age: 44
End: 2019-04-24

## 2019-04-27 LAB
CULTURE RESULTS: SIGNIFICANT CHANGE UP
CULTURE RESULTS: SIGNIFICANT CHANGE UP
SPECIMEN SOURCE: SIGNIFICANT CHANGE UP
SPECIMEN SOURCE: SIGNIFICANT CHANGE UP

## 2019-04-29 PROBLEM — M54.30 SCIATICA, UNSPECIFIED SIDE: Chronic | Status: ACTIVE | Noted: 2019-04-22

## 2019-05-06 ENCOUNTER — APPOINTMENT (OUTPATIENT)
Dept: ORTHOPEDIC SURGERY | Facility: CLINIC | Age: 44
End: 2019-05-06
Payer: MEDICAID

## 2019-05-06 VITALS — SYSTOLIC BLOOD PRESSURE: 113 MMHG | HEART RATE: 64 BPM | DIASTOLIC BLOOD PRESSURE: 73 MMHG

## 2019-05-06 VITALS — WEIGHT: 126 LBS | BODY MASS INDEX: 22.32 KG/M2 | HEIGHT: 63 IN

## 2019-05-06 PROCEDURE — 99214 OFFICE O/P EST MOD 30 MIN: CPT

## 2019-05-06 NOTE — HISTORY OF PRESENT ILLNESS
[de-identified] : I previously saw her in 2014, 2015 2016 with symptoms of diffuse back pain and radiculopathy that each time resolved very quickly with diclofenac. We will last 6 months she developed some pain in both anterior thighs and the right lateral It was worse with walking and 2 weeks ago developed right-sided lower back pain. She has had gastritis symptoms since January as she had been taking ibuprofen 600 mg 3 times a day. She is not yet had an endoscopy nor is she on a proton pump inhibitor. She continues to be treated for lupus. [Pain Location] : pain

## 2019-05-06 NOTE — PHYSICAL EXAM
[de-identified] : She she is fully alert and oriented with normal mood and affect and she is in no acute distress. There is no paravertebral muscle spasm or sciatic notch tenderness. There is a trace of trochanteric tenderness on the right. Her lower extremity neurological examination revealed 1-2+ symmetrical reflexes were downgoing toes. Motor and sensory exam is normal straight leg raising is negative to 90°.

## 2019-05-10 ENCOUNTER — APPOINTMENT (OUTPATIENT)
Dept: DERMATOLOGY | Facility: CLINIC | Age: 44
End: 2019-05-10
Payer: MEDICAID

## 2019-05-10 VITALS — HEIGHT: 63 IN | BODY MASS INDEX: 22.32 KG/M2 | WEIGHT: 126 LBS

## 2019-05-10 PROCEDURE — 99213 OFFICE O/P EST LOW 20 MIN: CPT

## 2019-05-10 NOTE — HISTORY OF PRESENT ILLNESS
[de-identified] : f/u for acne;  using tretinoin;\par has good effect from TCA txs,\par did not do well with po txs; \par doing better with addition of aczone 5% gel

## 2019-05-10 NOTE — ASSESSMENT
[FreeTextEntry1] : acne;  cont aczone/tretinoin; doing well\par f/u for TCA q 2-3 months;\par used glyMask, po txs already without good effect;\par \par also:  discussed OTC Eucerin redness to use prn for facial flares in summer\par \par

## 2019-05-10 NOTE — PHYSICAL EXAM
[FreeTextEntry3] : Skin examination performed of the face, neck, chest, hands, lower legs;\par The patient is well, alert and oriented, pleasant and cooperative.\par Eyelids, conjunctivae, oral mucosa, digits and nails all normal.  \par No cervical adenopathy.\par \par \par decrease acneiform papules on jawline and cheeks

## 2019-06-06 ENCOUNTER — FORM ENCOUNTER (OUTPATIENT)
Age: 44
End: 2019-06-06

## 2019-06-07 ENCOUNTER — APPOINTMENT (OUTPATIENT)
Dept: MAMMOGRAPHY | Facility: CLINIC | Age: 44
End: 2019-06-07
Payer: MEDICAID

## 2019-06-07 ENCOUNTER — OUTPATIENT (OUTPATIENT)
Dept: OUTPATIENT SERVICES | Facility: HOSPITAL | Age: 44
LOS: 1 days | End: 2019-06-07
Payer: MEDICAID

## 2019-06-07 DIAGNOSIS — Z00.8 ENCOUNTER FOR OTHER GENERAL EXAMINATION: ICD-10-CM

## 2019-06-07 PROCEDURE — 77063 BREAST TOMOSYNTHESIS BI: CPT | Mod: 26

## 2019-06-07 PROCEDURE — 77067 SCR MAMMO BI INCL CAD: CPT | Mod: 26

## 2019-06-07 PROCEDURE — 77063 BREAST TOMOSYNTHESIS BI: CPT

## 2019-06-07 PROCEDURE — 77067 SCR MAMMO BI INCL CAD: CPT

## 2019-07-02 ENCOUNTER — RX RENEWAL (OUTPATIENT)
Age: 44
End: 2019-07-02

## 2019-07-29 ENCOUNTER — APPOINTMENT (OUTPATIENT)
Dept: DERMATOLOGY | Facility: CLINIC | Age: 44
End: 2019-07-29
Payer: MEDICAID

## 2019-07-29 PROCEDURE — 99213 OFFICE O/P EST LOW 20 MIN: CPT

## 2019-07-29 NOTE — ASSESSMENT
[FreeTextEntry1] : acne;  some outbreaks; discussed that she can use tretinoin during summer;\par f/u for TCA q 2-3 months;\par used glyMask, po txs already without good effect;\par \par \par \par

## 2019-07-29 NOTE — PHYSICAL EXAM
[FreeTextEntry3] : Skin examination performed of the face, neck, chest, hands, lower legs;\par The patient is well, alert and oriented, pleasant and cooperative.\par Eyelids, conjunctivae, oral mucosa, digits and nails all normal.  \par No cervical adenopathy.\par \par \par decreased acneiform papules on jawline and cheeks

## 2019-07-29 NOTE — HISTORY OF PRESENT ILLNESS
[de-identified] : f/u for acne;  using tretinoin;\par has good effect from TCA txs,\par did not do well with po txs; \par currently using clindamycin, no tretinoin

## 2019-09-17 ENCOUNTER — RX RENEWAL (OUTPATIENT)
Age: 44
End: 2019-09-17

## 2019-09-25 ENCOUNTER — APPOINTMENT (OUTPATIENT)
Dept: INTERNAL MEDICINE | Facility: CLINIC | Age: 44
End: 2019-09-25
Payer: MEDICAID

## 2019-09-25 ENCOUNTER — NON-APPOINTMENT (OUTPATIENT)
Age: 44
End: 2019-09-25

## 2019-09-25 VITALS
RESPIRATION RATE: 14 BRPM | HEART RATE: 64 BPM | SYSTOLIC BLOOD PRESSURE: 120 MMHG | BODY MASS INDEX: 22.86 KG/M2 | HEIGHT: 63 IN | DIASTOLIC BLOOD PRESSURE: 80 MMHG | OXYGEN SATURATION: 99 % | WEIGHT: 129 LBS | TEMPERATURE: 98.3 F

## 2019-09-25 DIAGNOSIS — M06.9 RHEUMATOID ARTHRITIS, UNSPECIFIED: ICD-10-CM

## 2019-09-25 PROCEDURE — 93000 ELECTROCARDIOGRAM COMPLETE: CPT

## 2019-09-25 PROCEDURE — 99386 PREV VISIT NEW AGE 40-64: CPT | Mod: 25

## 2019-09-25 PROCEDURE — 36415 COLL VENOUS BLD VENIPUNCTURE: CPT

## 2019-09-25 RX ORDER — CIPROFLOXACIN AND DEXAMETHASONE 3; 1 MG/ML; MG/ML
0.3-0.1 SUSPENSION/ DROPS AURICULAR (OTIC)
Refills: 0 | Status: DISCONTINUED | COMMUNITY
End: 2019-09-25

## 2019-09-25 NOTE — PHYSICAL EXAM
[No Acute Distress] : no acute distress [Well Nourished] : well nourished [Well Developed] : well developed [Well-Appearing] : well-appearing [Normal Sclera/Conjunctiva] : normal sclera/conjunctiva [PERRL] : pupils equal round and reactive to light [EOMI] : extraocular movements intact [Normal Outer Ear/Nose] : the outer ears and nose were normal in appearance [Normal Oropharynx] : the oropharynx was normal [No JVD] : no jugular venous distention [No Lymphadenopathy] : no lymphadenopathy [Supple] : supple [Thyroid Normal, No Nodules] : the thyroid was normal and there were no nodules present [No Respiratory Distress] : no respiratory distress  [No Accessory Muscle Use] : no accessory muscle use [Clear to Auscultation] : lungs were clear to auscultation bilaterally [Normal Rate] : normal rate  [Regular Rhythm] : with a regular rhythm [Normal S1, S2] : normal S1 and S2 [No Carotid Bruits] : no carotid bruits [No Abdominal Bruit] : a ~M bruit was not heard ~T in the abdomen [No Varicosities] : no varicosities [Pedal Pulses Present] : the pedal pulses are present [No Edema] : there was no peripheral edema [No Palpable Aorta] : no palpable aorta [No Extremity Clubbing/Cyanosis] : no extremity clubbing/cyanosis [Soft] : abdomen soft [Non Tender] : non-tender [Non-distended] : non-distended [No Masses] : no abdominal mass palpated [No HSM] : no HSM [Normal Bowel Sounds] : normal bowel sounds [Normal Anterior Cervical Nodes] : no anterior cervical lymphadenopathy [Normal Posterior Cervical Nodes] : no posterior cervical lymphadenopathy [No CVA Tenderness] : no CVA  tenderness [No Spinal Tenderness] : no spinal tenderness [Grossly Normal Strength/Tone] : grossly normal strength/tone [No Joint Swelling] : no joint swelling [No Rash] : no rash [Coordination Grossly Intact] : coordination grossly intact [No Focal Deficits] : no focal deficits [Normal Gait] : normal gait [Deep Tendon Reflexes (DTR)] : deep tendon reflexes were 2+ and symmetric [Normal Affect] : the affect was normal [Normal Insight/Judgement] : insight and judgment were intact [I] : a grade 1

## 2019-09-26 LAB
25(OH)D3 SERPL-MCNC: 63.2 NG/ML
ALBUMIN SERPL ELPH-MCNC: 4.7 G/DL
ALP BLD-CCNC: 60 U/L
ALT SERPL-CCNC: 20 U/L
ANION GAP SERPL CALC-SCNC: 11 MMOL/L
APPEARANCE: CLEAR
AST SERPL-CCNC: 15 U/L
BASOPHILS # BLD AUTO: 0.06 K/UL
BASOPHILS NFR BLD AUTO: 1.2 %
BILIRUB SERPL-MCNC: 0.4 MG/DL
BILIRUBIN URINE: NEGATIVE
BLOOD URINE: NEGATIVE
BUN SERPL-MCNC: 12 MG/DL
CALCIUM SERPL-MCNC: 9.7 MG/DL
CHLORIDE SERPL-SCNC: 103 MMOL/L
CHOLEST SERPL-MCNC: 145 MG/DL
CHOLEST/HDLC SERPL: 2.6 RATIO
CK SERPL-CCNC: 65 U/L
CO2 SERPL-SCNC: 28 MMOL/L
COLOR: COLORLESS
CREAT SERPL-MCNC: 0.66 MG/DL
EOSINOPHIL # BLD AUTO: 0.16 K/UL
EOSINOPHIL NFR BLD AUTO: 3.1 %
ESTIMATED AVERAGE GLUCOSE: 105 MG/DL
GLUCOSE QUALITATIVE U: NEGATIVE
GLUCOSE SERPL-MCNC: 89 MG/DL
HBA1C MFR BLD HPLC: 5.3 %
HCT VFR BLD CALC: 41.1 %
HDLC SERPL-MCNC: 56 MG/DL
HGB BLD-MCNC: 13.6 G/DL
IMM GRANULOCYTES NFR BLD AUTO: 0.2 %
KETONES URINE: NEGATIVE
LDLC SERPL CALC-MCNC: 80 MG/DL
LEUKOCYTE ESTERASE URINE: NEGATIVE
LYMPHOCYTES # BLD AUTO: 1.39 K/UL
LYMPHOCYTES NFR BLD AUTO: 27 %
MAN DIFF?: NORMAL
MCHC RBC-ENTMCNC: 30.6 PG
MCHC RBC-ENTMCNC: 33.1 GM/DL
MCV RBC AUTO: 92.6 FL
MONOCYTES # BLD AUTO: 0.37 K/UL
MONOCYTES NFR BLD AUTO: 7.2 %
NEUTROPHILS # BLD AUTO: 3.16 K/UL
NEUTROPHILS NFR BLD AUTO: 61.3 %
NITRITE URINE: NEGATIVE
PH URINE: 6.5
PLATELET # BLD AUTO: 174 K/UL
POTASSIUM SERPL-SCNC: 4.5 MMOL/L
PROT SERPL-MCNC: 7.2 G/DL
PROTEIN URINE: NEGATIVE
RBC # BLD: 4.44 M/UL
RBC # FLD: 12.4 %
SODIUM SERPL-SCNC: 142 MMOL/L
SPECIFIC GRAVITY URINE: 1.01
TRIGL SERPL-MCNC: 46 MG/DL
TSH SERPL-ACNC: 0.73 UIU/ML
UROBILINOGEN URINE: NORMAL
WBC # FLD AUTO: 5.15 K/UL

## 2019-10-01 ENCOUNTER — NON-APPOINTMENT (OUTPATIENT)
Age: 44
End: 2019-10-01

## 2019-10-01 ENCOUNTER — APPOINTMENT (OUTPATIENT)
Dept: CARDIOLOGY | Facility: CLINIC | Age: 44
End: 2019-10-01
Payer: MEDICAID

## 2019-10-01 VITALS
SYSTOLIC BLOOD PRESSURE: 111 MMHG | OXYGEN SATURATION: 100 % | WEIGHT: 125 LBS | BODY MASS INDEX: 22.15 KG/M2 | HEART RATE: 59 BPM | HEIGHT: 63 IN | DIASTOLIC BLOOD PRESSURE: 76 MMHG

## 2019-10-01 PROCEDURE — 93000 ELECTROCARDIOGRAM COMPLETE: CPT

## 2019-10-01 PROCEDURE — 99204 OFFICE O/P NEW MOD 45 MIN: CPT

## 2019-10-01 NOTE — PHYSICAL EXAM
[General Appearance - Well Developed] : well developed [Well Groomed] : well groomed [Normal Appearance] : normal appearance [General Appearance - Well Nourished] : well nourished [No Deformities] : no deformities [Normal Conjunctiva] : the conjunctiva exhibited no abnormalities [General Appearance - In No Acute Distress] : no acute distress [Eyelids - No Xanthelasma] : the eyelids demonstrated no xanthelasmas [Normal Oral Mucosa] : normal oral mucosa [No Oral Cyanosis] : no oral cyanosis [No Oral Pallor] : no oral pallor [Normal Jugular Venous A Waves Present] : normal jugular venous A waves present [Normal Jugular Venous V Waves Present] : normal jugular venous V waves present [No Jugular Venous Simms A Waves] : no jugular venous simms A waves [Normal S1] : normal S1 [Normal Rate] : normal [S3] : no S3 [Normal S2] : normal S2 [No Murmur] : no murmurs heard [S4] : no S4 [Right Carotid Bruit] : no bruit heard over the right carotid [Left Carotid Bruit] : no bruit heard over the left carotid [Right Femoral Bruit] : no bruit heard over the right femoral artery [Left Femoral Bruit] : no bruit heard over the left femoral artery [2+] : left 2+ [No Abnormalities] : the abdominal aorta was not enlarged and no bruit was heard [No Pitting Edema] : no pitting edema present [Respiration, Rhythm And Depth] : normal respiratory rhythm and effort [Exaggerated Use Of Accessory Muscles For Inspiration] : no accessory muscle use [Abdomen Soft] : soft [Auscultation Breath Sounds / Voice Sounds] : lungs were clear to auscultation bilaterally [Abdomen Tenderness] : non-tender [Abdomen Mass (___ Cm)] : no abdominal mass palpated [Gait - Sufficient For Exercise Testing] : the gait was sufficient for exercise testing [Abnormal Walk] : normal gait [Cyanosis, Localized] : no localized cyanosis [Nail Clubbing] : no clubbing of the fingernails [Petechial Hemorrhages (___cm)] : no petechial hemorrhages [Skin Color & Pigmentation] : normal skin color and pigmentation [] : no rash [Skin Lesions] : no skin lesions [No Venous Stasis] : no venous stasis [No Skin Ulcers] : no skin ulcer [No Xanthoma] : no  xanthoma was observed [Oriented To Time, Place, And Person] : oriented to person, place, and time [Mood] : the mood was normal [Affect] : the affect was normal [No Anxiety] : not feeling anxious

## 2019-10-01 NOTE — HISTORY OF PRESENT ILLNESS
[FreeTextEntry1] : Cynthia is a 43 year old female here for initial evaluation.\par \par She has a history of lupus and rheumatoid arthritis. She also reports a history of costochondritis. She presented to the emergency room in 4/19 with chest pain, that was deemed to be because of this. A CT PA was negative for PE.\par \par She is able to do yoga a few times per week. She does not exercise on a treadmill, because of recurrent joint pain. She is currently on Plaquenil for her lupus. She has no known history of coronary artery disease. She was found to have an abnormal EKG at her PMD office, and is here for evaluation. She is a nonsmoker, has no family history of coronary artery disease. She denies chest pain, difficulty breathing, palpitations, lower extremity swelling, orthopnea, PND, dizziness, lightheadedness, near syncope. She reports an echocardiogram about 5 years ago, demonstrating a leaky valve.

## 2019-10-01 NOTE — DISCUSSION/SUMMARY
[With Me] : with me [FreeTextEntry1] : Cynthia is a 43 year old female here for evaluation. \par \par She has a history of lupus and rheumatoid arthritis. Her EKG today demonstrates an atrial bradycardia, with a left axis deviation, and a possible anteroseptal infarct pattern. She has no worrisome symptoms to suggest obstructive CAD, though has presented with chest pain in the past.\par \par Her blood pressure is at goal. Her physical exam is unremarkable. In the setting of this abnormal EKG and lupus, she is aware that she is at increased risk for CAD. She will have a 2-D echocardiogram to evaluate for structural heart disease, and an exercise stress test to evaluate for exercise-induced ischemia. She will try to stay active, despite her joint pain, and will eat right. I will call her with the results of the above tests, and arrange followup.

## 2019-10-12 ENCOUNTER — APPOINTMENT (OUTPATIENT)
Dept: CARDIOLOGY | Facility: CLINIC | Age: 44
End: 2019-10-12
Payer: MEDICAID

## 2019-10-12 PROCEDURE — 93306 TTE W/DOPPLER COMPLETE: CPT

## 2019-10-30 ENCOUNTER — APPOINTMENT (OUTPATIENT)
Dept: OBGYN | Facility: CLINIC | Age: 44
End: 2019-10-30
Payer: MEDICAID

## 2019-10-30 VITALS
SYSTOLIC BLOOD PRESSURE: 120 MMHG | DIASTOLIC BLOOD PRESSURE: 60 MMHG | WEIGHT: 125 LBS | BODY MASS INDEX: 22.15 KG/M2 | HEIGHT: 63 IN

## 2019-10-30 DIAGNOSIS — Z01.419 ENCOUNTER FOR GYNECOLOGICAL EXAMINATION (GENERAL) (ROUTINE) W/OUT ABNORMAL FINDINGS: ICD-10-CM

## 2019-10-30 PROCEDURE — 82270 OCCULT BLOOD FECES: CPT

## 2019-10-30 PROCEDURE — 99396 PREV VISIT EST AGE 40-64: CPT

## 2019-10-30 NOTE — PHYSICAL EXAM
[Awake] : awake [Alert] : alert [Acute Distress] : no acute distress [Mass] : no breast mass [Nipple Discharge] : no nipple discharge [Axillary LAD] : no axillary lymphadenopathy [Soft] : soft [Tender] : non tender [Oriented x3] : oriented to person, place, and time [Normal] : uterus [No Bleeding] : there was no active vaginal bleeding [IUD String] : had an IUD string protruding out [Pap Obtained] : a Pap smear was performed [Uterine Adnexae] : were not tender and not enlarged [No Tenderness] : no rectal tenderness [Occult Blood] : occult blood test from digital rectal exam was negative [Nl Sphincter Tone] : normal sphincter tone

## 2019-10-30 NOTE — CHIEF COMPLAINT
[Annual Visit] : annual visit [FreeTextEntry1] : Patient is a 43-year-old female presents for routine annual gynecologic examination. Patient has no complaints. She has a Mirena IUD which was inserted and 2016. Patient's last mammogram was June 2019

## 2019-10-31 LAB
C TRACH RRNA SPEC QL NAA+PROBE: NOT DETECTED
HPV HIGH+LOW RISK DNA PNL CVX: NOT DETECTED
N GONORRHOEA RRNA SPEC QL NAA+PROBE: NOT DETECTED
SOURCE TP AMPLIFICATION: NORMAL

## 2019-11-04 ENCOUNTER — APPOINTMENT (OUTPATIENT)
Dept: DERMATOLOGY | Facility: CLINIC | Age: 44
End: 2019-11-04
Payer: MEDICAID

## 2019-11-04 PROCEDURE — 99213 OFFICE O/P EST LOW 20 MIN: CPT

## 2019-11-04 NOTE — PHYSICAL EXAM
[FreeTextEntry3] : Skin examination performed of the face, neck, chest, hands, lower legs;\par The patient is well, alert and oriented, pleasant and cooperative.\par Eyelids, conjunctivae, oral mucosa, digits and nails all normal.  \par No cervical adenopathy.\par \par \par decreased acneiform papules on jawline and cheeks- some on neck

## 2019-11-04 NOTE — HISTORY OF PRESENT ILLNESS
[de-identified] : f/u for acne;  using topicals\par has good effect from TCA txs,\par did not do well with po txs; \par currently using clindamycin, tretinoin

## 2019-11-07 ENCOUNTER — APPOINTMENT (OUTPATIENT)
Dept: CARDIOLOGY | Facility: CLINIC | Age: 44
End: 2019-11-07
Payer: MEDICAID

## 2019-11-07 PROCEDURE — 93015 CV STRESS TEST SUPVJ I&R: CPT

## 2019-12-27 ENCOUNTER — APPOINTMENT (OUTPATIENT)
Dept: GASTROENTEROLOGY | Facility: CLINIC | Age: 44
End: 2019-12-27
Payer: MEDICAID

## 2019-12-27 VITALS
SYSTOLIC BLOOD PRESSURE: 109 MMHG | HEIGHT: 63 IN | WEIGHT: 124 LBS | BODY MASS INDEX: 21.97 KG/M2 | HEART RATE: 65 BPM | OXYGEN SATURATION: 95 % | DIASTOLIC BLOOD PRESSURE: 74 MMHG

## 2019-12-27 PROCEDURE — 99204 OFFICE O/P NEW MOD 45 MIN: CPT

## 2019-12-27 NOTE — HISTORY OF PRESENT ILLNESS
[de-identified] : Here for a second opinion.\par \par 44-year-old woman with history of lupus, H. pylori bacteria who presents with complaints of right-sided abdominal pain.\par \par The patient reports for the past 2-3 years she's been having right-sided epigastric abdominal pain. She describes this pain as dull, sharp, twisty type of pain.   The pain typically occurs after eating a meal - pain is 8/10 on pain scale.  Foods that trigger pain are tomato sauce, or oily foods she can have this pain. The pain can last for up to an hour. Typically doesn't radiate.  She was given FDGuard  which hasn't helped. She's tried Prilosec and it hasn't helped with the pain.\par She says when she is nervous or anxious the pain can escalate.\par \par She said she lost her appetite. She denies any abnormal weight loss.\par \par She reports feeling constipated for many years - has a BM every other day. When she eats a better diet, she has relief of her constipation. She has tried MiraLAX in the past which also helps.  When she has better stools - the pain can lessen.  She denies any melena and hematochezia.\par \par She's had 2 upper endoscopies. Her initial endoscopy was 2 years ago and gastritis was reported. Her last endoscopy was May of this year and it was unremarkable.\par \par 4 years ago she had H. pylori and was treated.\par \par January 2019 she had an abdominal sonogram that showed a contracted gallbladder otherwise unremarkable.\par \par She says she's had a CAT scan of the abdomen however I don't see records within the [facility] system.\par \par She's had a CT angio of chest in April of 2019 to rule out PE since she was having chest pains.  She was diagnosed with costochondritis.\par \par She says because of her lupus she gets pains in her knees and in her toes - at times in her chest.  She does not takes NSAIDs. \par \par She denies any family member with gastrointestinal cancers.\par \par She's never had a colonoscopy.\par \par \par All other review of systems are negative.  Denies cardiac symptoms.

## 2019-12-27 NOTE — PHYSICAL EXAM
[General Appearance - In No Acute Distress] : in no acute distress [General Appearance - Alert] : alert [Sclera] : the sclera and conjunctiva were normal [Extraocular Movements] : extraocular movements were intact [Outer Ear] : the ears and nose were normal in appearance [Hearing Threshold Finger Rub Not Hall] : hearing was normal [Neck Appearance] : the appearance of the neck was normal [Neck Cervical Mass (___cm)] : no neck mass was observed [Heart Rate And Rhythm] : heart rate was normal and rhythm regular [Auscultation Breath Sounds / Voice Sounds] : lungs were clear to auscultation bilaterally [Heart Sounds Gallop] : no gallops [Heart Sounds] : normal S1 and S2 [Heart Sounds Pericardial Friction Rub] : no pericardial rub [Murmurs] : no murmurs [Bowel Sounds] : normal bowel sounds [Abdomen Soft] : soft [Cervical Lymph Nodes Enlarged Posterior Bilaterally] : posterior cervical [Cervical Lymph Nodes Enlarged Anterior Bilaterally] : anterior cervical [Abdomen Tenderness] : non-tender [Abnormal Walk] : normal gait [Supraclavicular Lymph Nodes Enlarged Bilaterally] : supraclavicular [Skin Color & Pigmentation] : normal skin color and pigmentation [Nail Clubbing] : no clubbing  or cyanosis of the fingernails [] : no rash [Oriented To Time, Place, And Person] : oriented to person, place, and time [Impaired Insight] : insight and judgment were intact [Affect] : the affect was normal

## 2020-01-06 ENCOUNTER — OUTPATIENT (OUTPATIENT)
Dept: OUTPATIENT SERVICES | Facility: HOSPITAL | Age: 45
LOS: 1 days | End: 2020-01-06
Payer: MEDICAID

## 2020-01-06 DIAGNOSIS — R10.11 RIGHT UPPER QUADRANT PAIN: ICD-10-CM

## 2020-01-06 PROCEDURE — 78227 HEPATOBIL SYST IMAGE W/DRUG: CPT

## 2020-01-06 PROCEDURE — A9537: CPT

## 2020-01-06 PROCEDURE — 78227 HEPATOBIL SYST IMAGE W/DRUG: CPT | Mod: 26

## 2020-01-06 RX ORDER — SINCALIDE 5 UG/5ML
1.1 INJECTION, POWDER, LYOPHILIZED, FOR SOLUTION INTRAVENOUS ONCE
Refills: 0 | Status: COMPLETED | OUTPATIENT
Start: 2020-01-06 | End: 2020-01-06

## 2020-01-06 RX ADMIN — SINCALIDE 1.1 MICROGRAM(S): 5 INJECTION, POWDER, LYOPHILIZED, FOR SOLUTION INTRAVENOUS at 10:25

## 2020-01-09 ENCOUNTER — FORM ENCOUNTER (OUTPATIENT)
Age: 45
End: 2020-01-09

## 2020-01-10 ENCOUNTER — OUTPATIENT (OUTPATIENT)
Dept: OUTPATIENT SERVICES | Facility: HOSPITAL | Age: 45
LOS: 1 days | End: 2020-01-10
Payer: MEDICAID

## 2020-01-10 ENCOUNTER — APPOINTMENT (OUTPATIENT)
Dept: MRI IMAGING | Facility: CLINIC | Age: 45
End: 2020-01-10
Payer: MEDICAID

## 2020-01-10 DIAGNOSIS — Z00.8 ENCOUNTER FOR OTHER GENERAL EXAMINATION: ICD-10-CM

## 2020-01-10 PROCEDURE — 74183 MRI ABD W/O CNTR FLWD CNTR: CPT | Mod: 26

## 2020-01-10 PROCEDURE — 74183 MRI ABD W/O CNTR FLWD CNTR: CPT

## 2020-01-10 PROCEDURE — A9585: CPT

## 2020-02-08 ENCOUNTER — EMERGENCY (EMERGENCY)
Facility: HOSPITAL | Age: 45
LOS: 0 days | Discharge: HOME | End: 2020-02-08
Attending: EMERGENCY MEDICINE
Payer: MEDICAID

## 2020-02-08 VITALS — WEIGHT: 123.9 LBS | HEIGHT: 63 IN

## 2020-02-08 VITALS
DIASTOLIC BLOOD PRESSURE: 61 MMHG | RESPIRATION RATE: 18 BRPM | HEART RATE: 60 BPM | SYSTOLIC BLOOD PRESSURE: 111 MMHG | OXYGEN SATURATION: 100 % | TEMPERATURE: 98 F

## 2020-02-08 DIAGNOSIS — M06.9 RHEUMATOID ARTHRITIS, UNSPECIFIED: ICD-10-CM

## 2020-02-08 DIAGNOSIS — M54.30 SCIATICA, UNSPECIFIED SIDE: ICD-10-CM

## 2020-02-08 DIAGNOSIS — Z88.1 ALLERGY STATUS TO OTHER ANTIBIOTIC AGENTS STATUS: ICD-10-CM

## 2020-02-08 DIAGNOSIS — J02.9 ACUTE PHARYNGITIS, UNSPECIFIED: ICD-10-CM

## 2020-02-08 DIAGNOSIS — M32.9 SYSTEMIC LUPUS ERYTHEMATOSUS, UNSPECIFIED: ICD-10-CM

## 2020-02-08 LAB — S PYO AG SPEC QL IA: NEGATIVE — SIGNIFICANT CHANGE UP

## 2020-02-08 PROCEDURE — 99283 EMERGENCY DEPT VISIT LOW MDM: CPT

## 2020-02-08 PROCEDURE — 87081 CULTURE SCREEN ONLY: CPT

## 2020-02-08 PROCEDURE — 87880 STREP A ASSAY W/OPTIC: CPT

## 2020-02-08 RX ORDER — PHENOL/SODIUM PHENOLATE
1 AEROSOL, SPRAY (ML) MUCOUS MEMBRANE
Qty: 1 | Refills: 0
Start: 2020-02-08

## 2020-02-08 NOTE — ED STATDOCS - OBJECTIVE STATEMENT
44 YOF PMHx of lupus, RA presents to ED complaining of sore throat since yesterday. Pt notes chills 4-5 days ago, reports contact with son at home who was found strep +. Denies cough. Took no meds for symptom relief.

## 2020-02-08 NOTE — ED STATDOCS - NSFOLLOWUPINSTRUCTIONS_ED_ALL_ED_FT
Pharyngitis    WHAT YOU NEED TO KNOW:    Pharyngitis, or sore throat, is inflammation of the tissues and structures in your pharynx (throat). Pharyngitis is most often caused by bacteria. It may also be caused by a cold or flu virus. Other causes include smoking, allergies, or acid reflux.     DISCHARGE INSTRUCTIONS:    Call 911 for any of the following:     You have trouble breathing or swallowing because your throat is swollen or sore.        Return to the emergency department if:     You are drooling because it hurts too much to swallow.      Your fever is higher than 102°F (39°C) or lasts longer than 3 days.      You are confused.      You taste blood in your throat.    Contact your healthcare provider if:     Your throat pain gets worse.      You have a painful lump in your throat that does not go away after 5 days.      Your symptoms do not improve after 5 days.      You have questions or concerns about your condition or care.    Medicines: Viral pharyngitis will go away on its own without treatment. Your sore throat should start to feel better in 3 to 5 days for both viral and bacterial infections. You may need any of the following:     Antibiotics treat a bacterial infection.      NSAIDs, such as ibuprofen, help decrease swelling, pain, and fever. NSAIDs can cause stomach bleeding or kidney problems in certain people. If you take blood thinner medicine, always ask your healthcare provider if NSAIDs are safe for you. Always read the medicine label and follow directions.      Acetaminophen decreases pain and fever. It is available without a doctor's order. Ask how much to take and how often to take it. Follow directions. Acetaminophen can cause liver damage if not taken correctly.      Take your medicine as directed. Contact your healthcare provider if you think your medicine is not helping or if you have side effects. Tell him or her if you are allergic to any medicine. Keep a list of the medicines, vitamins, and herbs you take. Include the amounts, and when and why you take them. Bring the list or the pill bottles to follow-up visits. Carry your medicine list with you in case of an emergency.    Manage your symptoms:     Gargle salt water. Mix ¼ teaspoon salt in an 8 ounce glass of warm water and gargle. This may help decrease swelling in your throat.      Drink liquids as directed. You may need to drink more liquids than usual. Liquids may help soothe your throat and prevent dehydration. Ask how much liquid to drink each day and which liquids are best for you.      Use a cool-steam humidifier to help moisten the air in your room and calm your cough.      Soothe your throat with cough drops, ice, soft foods, or popsicles.    Prevent the spread of pharyngitis: Cover your mouth and nose when you cough or sneeze. Do not share food or drinks. Wash your hands often. Use soap and water. If soap and water are unavailable, use an alcohol based hand .     Follow up with your healthcare provider as directed: Write down your questions so you remember to ask them during your visits.

## 2020-02-08 NOTE — ED STATDOCS - CLINICAL SUMMARY MEDICAL DECISION MAKING FREE TEXT BOX
Strep test and re-eval. Strep test and re-eval. rapid strep negative, offered decadron and refused. Plan for dc

## 2020-02-08 NOTE — ED STATDOCS - PATIENT PORTAL LINK FT
You can access the FollowMyHealth Patient Portal offered by [facility] by registering at the following website: [email or web address]. By joining [facility]’s FollowMyHealth portal, you will also be able to view your health information using other applications (apps) compatible with our system.

## 2020-02-14 ENCOUNTER — APPOINTMENT (OUTPATIENT)
Dept: DERMATOLOGY | Facility: CLINIC | Age: 45
End: 2020-02-14
Payer: MEDICAID

## 2020-02-14 ENCOUNTER — APPOINTMENT (OUTPATIENT)
Dept: INTERNAL MEDICINE | Facility: CLINIC | Age: 45
End: 2020-02-14

## 2020-02-14 PROCEDURE — 99213 OFFICE O/P EST LOW 20 MIN: CPT

## 2020-02-14 NOTE — ASSESSMENT
[FreeTextEntry1] : acne;  some outbreaks; stable on 2 topicals;  also gets facials \par f/u for TCA q 2-3 months;\par used glyMask, po txs already without good effect;\par \par also with rosacea;  flared recently;  now better;  ? viral trigger:\par discussed po tx if continues to have problems with rosacea;  pt. states allergic to doxy, would use MCN\par \par \par \par

## 2020-02-14 NOTE — PHYSICAL EXAM
[FreeTextEntry3] : Skin examination performed of the face, neck, chest, hands, lower legs;\par The patient is well, alert and oriented, pleasant and cooperative.\par Eyelids, conjunctivae, oral mucosa, digits and nails all normal.  \par No cervical adenopathy.\par \par \par decreased acneiform papules on jawline and cheeks- some on neck\par \par + erythema;  b/l cheeks

## 2020-02-14 NOTE — HISTORY OF PRESENT ILLNESS
[de-identified] : f/u for acne;  using topicals\par has good effect from TCA txs,\par did not do well with po txs; \par currently using clindamycin, tretinoin\par also:  rosacea;  significant flaring recently, now improved;

## 2020-02-26 ENCOUNTER — APPOINTMENT (OUTPATIENT)
Dept: INTERNAL MEDICINE | Facility: CLINIC | Age: 45
End: 2020-02-26
Payer: MEDICAID

## 2020-02-26 VITALS
HEART RATE: 75 BPM | BODY MASS INDEX: 22.86 KG/M2 | RESPIRATION RATE: 14 BRPM | SYSTOLIC BLOOD PRESSURE: 102 MMHG | WEIGHT: 129 LBS | DIASTOLIC BLOOD PRESSURE: 60 MMHG | TEMPERATURE: 98.2 F | HEIGHT: 63 IN | OXYGEN SATURATION: 97 %

## 2020-02-26 DIAGNOSIS — Z87.09 PERSONAL HISTORY OF OTHER DISEASES OF THE RESPIRATORY SYSTEM: ICD-10-CM

## 2020-02-26 PROCEDURE — 87880 STREP A ASSAY W/OPTIC: CPT | Mod: QW

## 2020-02-26 PROCEDURE — 99214 OFFICE O/P EST MOD 30 MIN: CPT | Mod: 25

## 2020-02-26 NOTE — HISTORY OF PRESENT ILLNESS
[Moderate] : moderate [___ Weeks ago] :  [unfilled] weeks ago [Sore Throat] : sore throat [Congestion] : congestion [Stable] : stable [Cough] : no cough [Wheezing] : no wheezing [Chills] : no chills [Anorexia] : no anorexia [Shortness Of Breath] : no shortness of breath [Earache] : no earache [Fatigue] : not fatigue [Fever] : no fever [FreeTextEntry8] : had recent negative Strep  5

## 2020-02-26 NOTE — PHYSICAL EXAM
[No Acute Distress] : no acute distress [Well Developed] : well developed [Well Nourished] : well nourished [Normal Voice/Communication] : normal voice/communication [Well-Appearing] : well-appearing [PERRL] : pupils equal round and reactive to light [Normal Sclera/Conjunctiva] : normal sclera/conjunctiva [Normal Outer Ear/Nose] : the outer ears and nose were normal in appearance [EOMI] : extraocular movements intact [Normal TMs] : both tympanic membranes were normal [No JVD] : no jugular venous distention [No Lymphadenopathy] : no lymphadenopathy [Supple] : supple [Thyroid Normal, No Nodules] : the thyroid was normal and there were no nodules present [No Respiratory Distress] : no respiratory distress  [No Accessory Muscle Use] : no accessory muscle use [Normal Rate] : normal rate  [Clear to Auscultation] : lungs were clear to auscultation bilaterally [Normal S1, S2] : normal S1 and S2 [Regular Rhythm] : with a regular rhythm [No Murmur] : no murmur heard [No Carotid Bruits] : no carotid bruits [No Varicosities] : no varicosities [Pedal Pulses Present] : the pedal pulses are present [No Abdominal Bruit] : a ~M bruit was not heard ~T in the abdomen [No Palpable Aorta] : no palpable aorta [No Edema] : there was no peripheral edema [Soft] : abdomen soft [No Extremity Clubbing/Cyanosis] : no extremity clubbing/cyanosis [Non-distended] : non-distended [Non Tender] : non-tender [No HSM] : no HSM [No Masses] : no abdominal mass palpated [Normal Bowel Sounds] : normal bowel sounds [Normal Posterior Cervical Nodes] : no posterior cervical lymphadenopathy [Normal Anterior Cervical Nodes] : no anterior cervical lymphadenopathy [No CVA Tenderness] : no CVA  tenderness [No Joint Swelling] : no joint swelling [No Spinal Tenderness] : no spinal tenderness [No Rash] : no rash [Grossly Normal Strength/Tone] : grossly normal strength/tone [Coordination Grossly Intact] : coordination grossly intact [No Focal Deficits] : no focal deficits [Normal Gait] : normal gait [Memory Grossly Normal] : memory grossly normal [Deep Tendon Reflexes (DTR)] : deep tendon reflexes were 2+ and symmetric [Speech Grossly Normal] : speech grossly normal [Normal Affect] : the affect was normal [Normal Mood] : the mood was normal [Normal Insight/Judgement] : insight and judgment were intact [de-identified] : throat mild redness

## 2020-02-28 DIAGNOSIS — Z87.09 PERSONAL HISTORY OF OTHER DISEASES OF THE RESPIRATORY SYSTEM: ICD-10-CM

## 2020-02-29 LAB — BACTERIA THROAT CULT: NORMAL

## 2020-03-05 ENCOUNTER — APPOINTMENT (OUTPATIENT)
Dept: OBGYN | Facility: CLINIC | Age: 45
End: 2020-03-05
Payer: MEDICAID

## 2020-03-05 VITALS
SYSTOLIC BLOOD PRESSURE: 100 MMHG | DIASTOLIC BLOOD PRESSURE: 64 MMHG | HEIGHT: 63 IN | TEMPERATURE: 98.1 F | BODY MASS INDEX: 21.97 KG/M2 | WEIGHT: 124 LBS

## 2020-03-05 DIAGNOSIS — Z86.19 PERSONAL HISTORY OF OTHER INFECTIOUS AND PARASITIC DISEASES: ICD-10-CM

## 2020-03-05 LAB
BILIRUB UR QL STRIP: NORMAL
CLARITY UR: CLEAR
COLLECTION METHOD: NORMAL
GLUCOSE UR-MCNC: NORMAL
HCG UR QL: 0.2 EU/DL
HGB UR QL STRIP.AUTO: NORMAL
KETONES UR-MCNC: NORMAL
LEUKOCYTE ESTERASE UR QL STRIP: NORMAL
NITRITE UR QL STRIP: NORMAL
PH UR STRIP: 7.5
PROT UR STRIP-MCNC: NORMAL
SP GR UR STRIP: 1.03

## 2020-03-05 PROCEDURE — 99214 OFFICE O/P EST MOD 30 MIN: CPT

## 2020-03-05 PROCEDURE — 81003 URINALYSIS AUTO W/O SCOPE: CPT | Mod: QW

## 2020-03-05 RX ORDER — TERCONAZOLE 8 MG/G
0.8 CREAM VAGINAL
Qty: 1 | Refills: 1 | Status: ACTIVE | COMMUNITY
Start: 2020-03-05 | End: 1900-01-01

## 2020-03-05 NOTE — CHIEF COMPLAINT
[Urgent Visit] : Urgent Visit [FreeTextEntry1] : pt c/o vulvar vaginal itching x 3 days, dc clumpy.\par taking amoxicillin for sinus infection has  2 more days

## 2020-03-05 NOTE — PHYSICAL EXAM
[Labia Majora Erythema] : erythema of the labia majora [Labia Minora Erythema] : erythema of the labia minora [Normal] : uterus [Discharge] : a  ~M vaginal discharge was present [Heavy] : heavy [White] : white [Thick] : thick [No Bleeding] : there was no active vaginal bleeding [Uterine Adnexae] : were not tender and not enlarged

## 2020-03-06 LAB
CANDIDA VAG CYTO: DETECTED
G VAGINALIS+PREV SP MTYP VAG QL MICRO: NOT DETECTED
T VAGINALIS VAG QL WET PREP: NOT DETECTED

## 2020-03-09 ENCOUNTER — APPOINTMENT (OUTPATIENT)
Dept: DERMATOLOGY | Facility: CLINIC | Age: 45
End: 2020-03-09

## 2020-03-09 ENCOUNTER — APPOINTMENT (OUTPATIENT)
Dept: INTERNAL MEDICINE | Facility: CLINIC | Age: 45
End: 2020-03-09
Payer: MEDICAID

## 2020-03-09 VITALS
DIASTOLIC BLOOD PRESSURE: 60 MMHG | SYSTOLIC BLOOD PRESSURE: 100 MMHG | OXYGEN SATURATION: 98 % | HEART RATE: 66 BPM | BODY MASS INDEX: 22.32 KG/M2 | WEIGHT: 126 LBS | RESPIRATION RATE: 14 BRPM | TEMPERATURE: 98.4 F | HEIGHT: 63 IN

## 2020-03-09 PROCEDURE — 99213 OFFICE O/P EST LOW 20 MIN: CPT

## 2020-03-09 RX ORDER — AMOXICILLIN AND CLAVULANATE POTASSIUM 875; 125 MG/1; MG/1
875-125 TABLET, COATED ORAL
Qty: 14 | Refills: 0 | Status: COMPLETED | COMMUNITY
Start: 2020-02-28 | End: 2020-03-09

## 2020-03-09 NOTE — HISTORY OF PRESENT ILLNESS
[FreeTextEntry8] : 44F presents for follow-up of sore throat. Since being seen 2/26, she has had persistent sore throat. Was on augmentin which didn't help. Notes hoarseness but denies fever, chills, ear pain, cough, dysphagia. Not worse with meals or lying down. \par

## 2020-03-09 NOTE — PHYSICAL EXAM
[No Acute Distress] : no acute distress [Normal Sclera/Conjunctiva] : normal sclera/conjunctiva [PERRL] : pupils equal round and reactive to light [EOMI] : extraocular movements intact [Normal Oropharynx] : the oropharynx was normal [Normal TMs] : both tympanic membranes were normal [Supple] : supple [No Respiratory Distress] : no respiratory distress  [No Accessory Muscle Use] : no accessory muscle use [Clear to Auscultation] : lungs were clear to auscultation bilaterally [Normal Rate] : normal rate  [Regular Rhythm] : with a regular rhythm [Normal S1, S2] : normal S1 and S2 [Soft] : abdomen soft [Non Tender] : non-tender [Non-distended] : non-distended [Normal Bowel Sounds] : normal bowel sounds [de-identified] : enlarged left submandibular LN

## 2020-03-09 NOTE — REVIEW OF SYSTEMS
[Fever] : no fever [Chills] : no chills [Night Sweats] : no night sweats [Earache] : no earache [Hoarseness] : hoarseness [Nasal Discharge] : no nasal discharge [Sore Throat] : sore throat [Chest Pain] : no chest pain [Palpitations] : no palpitations [Lower Ext Edema] : no lower extremity edema [Shortness Of Breath] : no shortness of breath [Wheezing] : no wheezing [Cough] : no cough [Dyspnea on Exertion] : no dyspnea on exertion [Abdominal Pain] : no abdominal pain [Nausea] : no nausea [Constipation] : no constipation [Diarrhea] : diarrhea [Vomiting] : no vomiting [Dysuria] : no dysuria

## 2020-03-13 ENCOUNTER — APPOINTMENT (OUTPATIENT)
Dept: DERMATOLOGY | Facility: CLINIC | Age: 45
End: 2020-03-13

## 2020-03-13 ENCOUNTER — APPOINTMENT (OUTPATIENT)
Dept: DERMATOLOGY | Facility: CLINIC | Age: 45
End: 2020-03-13
Payer: MEDICAID

## 2020-03-13 DIAGNOSIS — L56.8 OTHER SPECIFIED ACUTE SKIN CHANGES DUE TO ULTRAVIOLET RADIATION: ICD-10-CM

## 2020-03-13 PROCEDURE — 99213 OFFICE O/P EST LOW 20 MIN: CPT

## 2020-03-13 NOTE — ASSESSMENT
[FreeTextEntry1] : photodermatitis/rosacea;\par may have flared 2' using chemical based sunscreen; \par fluticasone ointment qHS x 7-10 days;  do not overuse;\par back to UV pure; use at work\par f/u if fails to improve

## 2020-03-13 NOTE — HISTORY OF PRESENT ILLNESS
[de-identified] : The patient has been fit in for urgent appointment. \par pt. c/o inflamed rashes on face, worse this past week;  burning

## 2020-03-13 NOTE — PHYSICAL EXAM
[FreeTextEntry3] : Skin examination performed of the face, neck, chest, hands, lower legs;\par The patient is well, alert and oriented, pleasant and cooperative.\par Eyelids, conjunctivae, oral mucosa, digits and nails all normal.  \par No cervical adenopathy.\par \par \par erythema;  diffuse over cheeks, nose, chin

## 2020-03-31 ENCOUNTER — APPOINTMENT (OUTPATIENT)
Dept: OTOLARYNGOLOGY | Facility: CLINIC | Age: 45
End: 2020-03-31
Payer: MEDICAID

## 2020-03-31 DIAGNOSIS — R09.82 POSTNASAL DRIP: ICD-10-CM

## 2020-03-31 PROCEDURE — 99204 OFFICE O/P NEW MOD 45 MIN: CPT

## 2020-03-31 RX ORDER — METHOCARBAMOL 750 MG/1
750 TABLET, FILM COATED ORAL
Qty: 60 | Refills: 0 | Status: ACTIVE | COMMUNITY
Start: 2019-12-13

## 2020-03-31 RX ORDER — FLUTICASONE PROPIONATE 50 UG/1
50 SPRAY, METERED NASAL
Qty: 16 | Refills: 0 | Status: ACTIVE | COMMUNITY
Start: 2020-03-04

## 2020-03-31 RX ORDER — SODIUM FLUORIDE 1.1 G/100G
1.1 GEL, DENTIFRICE ORAL
Qty: 51 | Refills: 0 | Status: ACTIVE | COMMUNITY
Start: 2019-10-01

## 2020-03-31 RX ORDER — CHROMIUM 200 MCG
TABLET ORAL
Refills: 0 | Status: ACTIVE | COMMUNITY

## 2020-03-31 NOTE — PHYSICAL EXAM
[Normal] : palatine tonsils are normal [FreeTextEntry1] : as seen on screen  [de-identified] : seen with flashlight held by patient in mouth

## 2020-03-31 NOTE — REASON FOR VISIT
[Initial Evaluation] : an initial evaluation for [FreeTextEntry1] : throat discomfort and raspy voice

## 2020-03-31 NOTE — REVIEW OF SYSTEMS
[Seasonal Allergies] : seasonal allergies [Post Nasal Drip] : post nasal drip [Hoarseness] : hoarseness [Throat Clearing] : throat clearing [Throat Pain] : throat pain [Throat Dryness] : throat dryness [Throat Itching] : throat itching [Joint Pain] : joint pain [Swollen Glands] : swollen glands [Negative] : Endocrine [FreeTextEntry1] : headache

## 2020-03-31 NOTE — ASSESSMENT
[FreeTextEntry1] : Patient with throat discomfort and history consistent with reflux - patient aware that with telehealth visit I could not examine neck or evaluate larynx.  Patient not helped by allergy meds or prior abx.  No respiratory issues but occ dry cough and raspy voice.  No nasal symptoms. Does note occ PND\par Recommended patient stop use of afrin.  Can use flonase as needed but would recommend use of OTC saline nasal sprays.  Also discussed reflux diet and also started patient on omeprazole once a day before breakfast.  Advised follow up in about 2 mos or sooner if symptoms increase

## 2020-03-31 NOTE — HISTORY OF PRESENT ILLNESS
[Home] : at home, [unfilled] , at the time of the visit. [Other Location: e.g. Home (Enter Location, City,State)___] : at [unfilled] [Patient] : the patient [Self] : self [de-identified] : This visit was provided via telehealth using real-time 2-way audio visual technology.  The patient NEPTALI JOYCE, Patient  was located at   [address]\[place] [address]., at the time of visit.\par The provider Marcos Lewis was located at remote location at the time of visit.\par The patient  NEPTALI JOYCE participated in the telehealth encounter.\par \par Telemedicine visit:\par \par Visit initiated at patient request.  The audio/visual platform (using [employer]) visit is occurring during the state of emergency due to COVID-19.  I discussed with the patient the limitations of telemedicine encounters including risks associated with the technology platform, technical difficulties, data security, and a limited physical exam.  There is also a limitation in performing diagnostic procedures and patient may need further testing and workup to arrive at a diagnosis and treatment plan.  We discussed this will be billed as a visit.   The patient NEPTALI JOYCE understood and elected to proceed.\par \par Patient is a new patient and is seen for evaluation of throat discomfort.  No SOB.  No hemoptysis or sputum production.  Patient very anxious.  Occ sl raspy voice and occ dry cough.   No fever and no problems breathing thru nose or problems with sense of smell   Patient has had problem since Feb.  Was seen by primary care  - Dr Hodge - with negative strep culture but was treated with augmentin with continued discomfort.  Also had had treatment with flonase.   Was told of ? of node on left side of neck - points to anterior cervical area  .  No throat pain now. No body aches.\par Patient has also tried claretin - and occ use of afrin.  \par

## 2020-04-16 ENCOUNTER — TRANSCRIPTION ENCOUNTER (OUTPATIENT)
Age: 45
End: 2020-04-16

## 2020-04-20 ENCOUNTER — TRANSCRIPTION ENCOUNTER (OUTPATIENT)
Age: 45
End: 2020-04-20

## 2020-04-20 ENCOUNTER — RX RENEWAL (OUTPATIENT)
Age: 45
End: 2020-04-20

## 2020-05-08 ENCOUNTER — APPOINTMENT (OUTPATIENT)
Dept: DERMATOLOGY | Facility: CLINIC | Age: 45
End: 2020-05-08

## 2020-05-26 DIAGNOSIS — Z11.59 ENCOUNTER FOR SCREENING FOR OTHER VIRAL DISEASES: ICD-10-CM

## 2020-08-28 DIAGNOSIS — R92.2 INCONCLUSIVE MAMMOGRAM: ICD-10-CM

## 2020-09-02 ENCOUNTER — APPOINTMENT (OUTPATIENT)
Dept: ORTHOPEDIC SURGERY | Facility: CLINIC | Age: 45
End: 2020-09-02

## 2020-10-02 ENCOUNTER — NON-APPOINTMENT (OUTPATIENT)
Age: 45
End: 2020-10-02

## 2020-10-02 ENCOUNTER — APPOINTMENT (OUTPATIENT)
Dept: INTERNAL MEDICINE | Facility: CLINIC | Age: 45
End: 2020-10-02
Payer: MEDICAID

## 2020-10-02 VITALS
OXYGEN SATURATION: 97 % | HEART RATE: 64 BPM | DIASTOLIC BLOOD PRESSURE: 64 MMHG | BODY MASS INDEX: 22.68 KG/M2 | HEIGHT: 63 IN | TEMPERATURE: 97.5 F | SYSTOLIC BLOOD PRESSURE: 112 MMHG | RESPIRATION RATE: 14 BRPM | WEIGHT: 128 LBS

## 2020-10-02 PROCEDURE — 93000 ELECTROCARDIOGRAM COMPLETE: CPT | Mod: 59

## 2020-10-02 PROCEDURE — 99396 PREV VISIT EST AGE 40-64: CPT | Mod: 25

## 2020-10-02 NOTE — PHYSICAL EXAM
[No Acute Distress] : no acute distress [Well Nourished] : well nourished [Well Developed] : well developed [Well-Appearing] : well-appearing [Normal Voice/Communication] : normal voice/communication [Normal Sclera/Conjunctiva] : normal sclera/conjunctiva [PERRL] : pupils equal round and reactive to light [EOMI] : extraocular movements intact [Normal Outer Ear/Nose] : the outer ears and nose were normal in appearance [Normal Oropharynx] : the oropharynx was normal [Normal TMs] : both tympanic membranes were normal [No JVD] : no jugular venous distention [No Lymphadenopathy] : no lymphadenopathy [Supple] : supple [Thyroid Normal, No Nodules] : the thyroid was normal and there were no nodules present [No Respiratory Distress] : no respiratory distress  [No Accessory Muscle Use] : no accessory muscle use [Clear to Auscultation] : lungs were clear to auscultation bilaterally [Normal Rate] : normal rate  [Regular Rhythm] : with a regular rhythm [Normal S1, S2] : normal S1 and S2 [No Murmur] : no murmur heard [No Carotid Bruits] : no carotid bruits [No Abdominal Bruit] : a ~M bruit was not heard ~T in the abdomen [No Varicosities] : no varicosities [Pedal Pulses Present] : the pedal pulses are present [No Edema] : there was no peripheral edema [No Palpable Aorta] : no palpable aorta [No Extremity Clubbing/Cyanosis] : no extremity clubbing/cyanosis [Declined Breast Exam] : declined breast exam  [Normal Appearance] : normal in appearance [Soft] : abdomen soft [Non Tender] : non-tender [Non-distended] : non-distended [No Masses] : no abdominal mass palpated [No HSM] : no HSM [Normal Bowel Sounds] : normal bowel sounds [Normal Supraclavicular Nodes] : no supraclavicular lymphadenopathy [Normal Posterior Cervical Nodes] : no posterior cervical lymphadenopathy [Normal Anterior Cervical Nodes] : no anterior cervical lymphadenopathy [No CVA Tenderness] : no CVA  tenderness [No Spinal Tenderness] : no spinal tenderness [No Joint Swelling] : no joint swelling [Grossly Normal Strength/Tone] : grossly normal strength/tone [No Rash] : no rash [Coordination Grossly Intact] : coordination grossly intact [No Focal Deficits] : no focal deficits [Normal Gait] : normal gait [Speech Grossly Normal] : speech grossly normal [Memory Grossly Normal] : memory grossly normal [Normal Affect] : the affect was normal [Alert and Oriented x3] : oriented to person, place, and time [Normal Mood] : the mood was normal [Normal Insight/Judgement] : insight and judgment were intact

## 2020-10-04 LAB
25(OH)D3 SERPL-MCNC: 49.4 NG/ML
ALBUMIN SERPL ELPH-MCNC: 4.7 G/DL
ALP BLD-CCNC: 60 U/L
ALT SERPL-CCNC: 14 U/L
ANION GAP SERPL CALC-SCNC: 12 MMOL/L
APPEARANCE: CLEAR
AST SERPL-CCNC: 14 U/L
BASOPHILS # BLD AUTO: 0.03 K/UL
BASOPHILS NFR BLD AUTO: 0.5 %
BILIRUB SERPL-MCNC: 0.5 MG/DL
BILIRUBIN URINE: NEGATIVE
BLOOD URINE: NEGATIVE
BUN SERPL-MCNC: 14 MG/DL
CALCIUM SERPL-MCNC: 9.4 MG/DL
CHLORIDE SERPL-SCNC: 100 MMOL/L
CHOLEST SERPL-MCNC: 175 MG/DL
CHOLEST/HDLC SERPL: 2.6 RATIO
CK SERPL-CCNC: 58 U/L
CO2 SERPL-SCNC: 26 MMOL/L
COLOR: NORMAL
CREAT SERPL-MCNC: 0.7 MG/DL
EOSINOPHIL # BLD AUTO: 0.05 K/UL
EOSINOPHIL NFR BLD AUTO: 0.9 %
ESTIMATED AVERAGE GLUCOSE: 100 MG/DL
GLUCOSE QUALITATIVE U: NEGATIVE
GLUCOSE SERPL-MCNC: 98 MG/DL
HBA1C MFR BLD HPLC: 5.1 %
HCT VFR BLD CALC: 41.4 %
HDLC SERPL-MCNC: 69 MG/DL
HGB BLD-MCNC: 14 G/DL
IMM GRANULOCYTES NFR BLD AUTO: 0.2 %
KETONES URINE: NEGATIVE
LDLC SERPL CALC-MCNC: 94 MG/DL
LEUKOCYTE ESTERASE URINE: NEGATIVE
LYMPHOCYTES # BLD AUTO: 1.21 K/UL
LYMPHOCYTES NFR BLD AUTO: 21.1 %
MAN DIFF?: NORMAL
MCHC RBC-ENTMCNC: 31.5 PG
MCHC RBC-ENTMCNC: 33.8 GM/DL
MCV RBC AUTO: 93 FL
MONOCYTES # BLD AUTO: 0.42 K/UL
MONOCYTES NFR BLD AUTO: 7.3 %
NEUTROPHILS # BLD AUTO: 4.01 K/UL
NEUTROPHILS NFR BLD AUTO: 70 %
NITRITE URINE: NEGATIVE
PH URINE: 6
PLATELET # BLD AUTO: 186 K/UL
POTASSIUM SERPL-SCNC: 4.7 MMOL/L
PROT SERPL-MCNC: 7 G/DL
PROTEIN URINE: NEGATIVE
RBC # BLD: 4.45 M/UL
RBC # FLD: 12.5 %
SODIUM SERPL-SCNC: 139 MMOL/L
SPECIFIC GRAVITY URINE: 1.01
TRIGL SERPL-MCNC: 63 MG/DL
TSH SERPL-ACNC: 0.67 UIU/ML
UROBILINOGEN URINE: NORMAL
VIT B12 SERPL-MCNC: 464 PG/ML
WBC # FLD AUTO: 5.73 K/UL

## 2020-10-07 LAB — VIT C SERPL-MCNC: 1.6 MG/DL

## 2020-10-09 ENCOUNTER — TRANSCRIPTION ENCOUNTER (OUTPATIENT)
Age: 45
End: 2020-10-09

## 2020-10-12 ENCOUNTER — APPOINTMENT (OUTPATIENT)
Dept: OTOLARYNGOLOGY | Facility: CLINIC | Age: 45
End: 2020-10-12

## 2020-10-13 ENCOUNTER — RX RENEWAL (OUTPATIENT)
Age: 45
End: 2020-10-13

## 2020-10-17 ENCOUNTER — RESULT REVIEW (OUTPATIENT)
Age: 45
End: 2020-10-17

## 2020-10-17 ENCOUNTER — APPOINTMENT (OUTPATIENT)
Dept: MAMMOGRAPHY | Facility: CLINIC | Age: 45
End: 2020-10-17
Payer: MEDICAID

## 2020-10-17 ENCOUNTER — APPOINTMENT (OUTPATIENT)
Dept: ULTRASOUND IMAGING | Facility: CLINIC | Age: 45
End: 2020-10-17
Payer: MEDICAID

## 2020-10-17 ENCOUNTER — OUTPATIENT (OUTPATIENT)
Dept: OUTPATIENT SERVICES | Facility: HOSPITAL | Age: 45
LOS: 1 days | End: 2020-10-17
Payer: MEDICAID

## 2020-10-17 DIAGNOSIS — Z00.8 ENCOUNTER FOR OTHER GENERAL EXAMINATION: ICD-10-CM

## 2020-10-17 DIAGNOSIS — Z12.39 ENCOUNTER FOR OTHER SCREENING FOR MALIGNANT NEOPLASM OF BREAST: ICD-10-CM

## 2020-10-17 PROCEDURE — 77063 BREAST TOMOSYNTHESIS BI: CPT | Mod: 26

## 2020-10-17 PROCEDURE — 77063 BREAST TOMOSYNTHESIS BI: CPT

## 2020-10-17 PROCEDURE — 76641 ULTRASOUND BREAST COMPLETE: CPT | Mod: 26,50

## 2020-10-17 PROCEDURE — 76641 ULTRASOUND BREAST COMPLETE: CPT

## 2020-10-17 PROCEDURE — 77067 SCR MAMMO BI INCL CAD: CPT | Mod: 26

## 2020-10-17 PROCEDURE — 77067 SCR MAMMO BI INCL CAD: CPT

## 2020-10-18 NOTE — ASSESSMENT
[FreeTextEntry1] : acne;  some outbreaks; stable on 2 topicals;  also gets facials \par f/u for TCA q 2-3 months;\par used glyMask, po txs already without good effect;\par \par \par \par 
equal bilaterally

## 2020-10-20 ENCOUNTER — NON-APPOINTMENT (OUTPATIENT)
Age: 45
End: 2020-10-20

## 2020-11-30 ENCOUNTER — APPOINTMENT (OUTPATIENT)
Dept: OTOLARYNGOLOGY | Facility: CLINIC | Age: 45
End: 2020-11-30
Payer: MEDICAID

## 2020-11-30 ENCOUNTER — EMERGENCY (EMERGENCY)
Age: 45
LOS: 1 days | Discharge: LEFT BEFORE DISCHARGE | End: 2020-11-30
Admitting: EMERGENCY MEDICINE
Payer: MEDICAID

## 2020-11-30 VITALS
OXYGEN SATURATION: 98 % | SYSTOLIC BLOOD PRESSURE: 128 MMHG | DIASTOLIC BLOOD PRESSURE: 56 MMHG | RESPIRATION RATE: 16 BRPM | HEART RATE: 67 BPM | TEMPERATURE: 98 F

## 2020-11-30 VITALS
HEIGHT: 63 IN | HEART RATE: 63 BPM | TEMPERATURE: 98 F | WEIGHT: 128 LBS | DIASTOLIC BLOOD PRESSURE: 57 MMHG | BODY MASS INDEX: 22.68 KG/M2 | SYSTOLIC BLOOD PRESSURE: 104 MMHG

## 2020-11-30 PROCEDURE — L9991: CPT

## 2020-11-30 PROCEDURE — 99214 OFFICE O/P EST MOD 30 MIN: CPT | Mod: 25

## 2020-11-30 PROCEDURE — 31231 NASAL ENDOSCOPY DX: CPT

## 2020-11-30 NOTE — ED PEDIATRIC TRIAGE NOTE - CHIEF COMPLAINT QUOTE
Pt. hit nose on truck of car door received approx. 1 cm laceration, not actively bleeding. Pt. concerned for broken nose. Hx of Lupus and RA. Allergic to Flexeril and Doxycyline.

## 2020-11-30 NOTE — HISTORY OF PRESENT ILLNESS
[de-identified] : c/o itching bilateral - right worse.  Problem for past several mos.  Has used ciprodex.  Hx of eczema. \par Has been here for  reflux - doing better on meds.   Patient had been seen as telehealth last visit Does c/o occ PND

## 2020-11-30 NOTE — PHYSICAL EXAM
[Normal] : palatine tonsils are normal [Laryngoscopy Performed] : laryngoscopy was performed, see procedure section for findings [de-identified] : dry eac bilat - no infection  [de-identified] : indirect exam - mod post glottic edema and interarytenoid edema  - not seen well

## 2020-11-30 NOTE — ED STATDOCS - OBJECTIVE STATEMENT
Pt here with child sent in by ortho for toe reduction. MOther reports hit bridge of her nose with base of trunk prior to coming here. +laceration across bridge of nose, hemostatic, appro. 2 cm.  DIscussed going to ED, pt registered, does not want to leave her son so she decided to go home. Gave Dr. Murillo info since mom reports she may want plastics to repair or go to an urgent care.  No breathing issues, no deformity observed. Pt left prior to going to adult ED.  REgistered only.

## 2020-11-30 NOTE — ASSESSMENT
[FreeTextEntry1] : Patient doing well with reflux.  Recommended continue reflux diet and omeprazole once a day as needed.  No evidence of siniusitis.  Patient also c/o occ itchy ears and does have eczema of eac.  No ear infection seen.  Recommended flucinolone ointment for ears.\par Follow up in 3-4 ms and as necessary

## 2020-12-09 ENCOUNTER — APPOINTMENT (OUTPATIENT)
Dept: INTERNAL MEDICINE | Facility: CLINIC | Age: 45
End: 2020-12-09
Payer: MEDICAID

## 2020-12-09 PROCEDURE — 99214 OFFICE O/P EST MOD 30 MIN: CPT | Mod: 95

## 2020-12-09 NOTE — HISTORY OF PRESENT ILLNESS
[Home] : at home, [unfilled] , at the time of the visit. [Medical Office: ([facility])___] : at the medical office located in  [Verbal consent obtained from patient] : the patient, [unfilled] [FreeTextEntry1] : Contact Co -worker who has COVID\par no symptoms

## 2020-12-09 NOTE — HEALTH RISK ASSESSMENT
[No] : No [No falls in past year] : Patient reported no falls in the past year [0] : 2) Feeling down, depressed, or hopeless: Not at all (0) [] : No [[id number]] : 0

## 2020-12-14 LAB — SARS-COV-2 N GENE NPH QL NAA+PROBE: NOT DETECTED

## 2020-12-15 PROBLEM — Z87.440 HISTORY OF URINARY TRACT INFECTION: Status: RESOLVED | Noted: 2017-10-02 | Resolved: 2020-12-15

## 2020-12-15 PROBLEM — N76.0: Status: RESOLVED | Noted: 2017-09-28 | Resolved: 2020-12-15

## 2020-12-21 PROBLEM — Z01.419 ENCOUNTER FOR ROUTINE GYNECOLOGICAL EXAMINATION WITH PAPANICOLAOU SMEAR OF CERVIX: Status: RESOLVED | Noted: 2019-10-30 | Resolved: 2020-12-21

## 2020-12-23 PROBLEM — Z87.09 HISTORY OF ACUTE SINUSITIS: Status: RESOLVED | Noted: 2020-02-28 | Resolved: 2020-12-23

## 2020-12-23 PROBLEM — Z86.19 HISTORY OF CANDIDAL VULVOVAGINITIS: Status: RESOLVED | Noted: 2017-10-10 | Resolved: 2020-12-23

## 2020-12-23 PROBLEM — Z87.09 HISTORY OF ACUTE PHARYNGITIS: Status: RESOLVED | Noted: 2020-02-26 | Resolved: 2020-12-23

## 2020-12-29 ENCOUNTER — RX RENEWAL (OUTPATIENT)
Age: 45
End: 2020-12-29

## 2021-03-09 ENCOUNTER — APPOINTMENT (OUTPATIENT)
Dept: OBGYN | Facility: CLINIC | Age: 46
End: 2021-03-09
Payer: MEDICAID

## 2021-03-09 PROCEDURE — 58301 REMOVE INTRAUTERINE DEVICE: CPT

## 2021-03-09 PROCEDURE — 99072 ADDL SUPL MATRL&STAF TM PHE: CPT

## 2021-03-09 NOTE — PROCEDURE
[IUD Removal] : intrauterine device (IUD) removal [Time out performed] : Pre-procedure time out performed.  Patient's name, date of birth and procedure confirmed. [Consent Obtained] : Consent obtained [ IUD] :  IUD [Risks] : risks [Benefits] : benefits [Alternatives] : alternatives [Patient] : patient [Speculum Placed] : speculum placed [IUD Removed - Forceps] : IUD removed - forceps [IUD Discarded] : IUD discarded [Sent to Pathology] : specimen was placed in buffered formalin and sent for pathology [Tolerated Well] : Patient tolerated the procedure well [No Complications] : no complications [Heavy Vaginal Bleeding] : for heavy vaginal bleeding [Pelvic Pain] : for pelvic pain [___ Month(s)] : in [unfilled] month(s)

## 2021-03-09 NOTE — HISTORY OF PRESENT ILLNESS
[FreeTextEntry1] : Patient is a 45-year-old female who presents for removal of her Mirena IUD. Patient had her IUD inserted in March of 2016. 5 years limit. Consent obtained

## 2021-03-11 ENCOUNTER — OUTPATIENT (OUTPATIENT)
Dept: OUTPATIENT SERVICES | Facility: HOSPITAL | Age: 46
LOS: 1 days | End: 2021-03-11
Payer: MEDICAID

## 2021-03-11 DIAGNOSIS — Z20.828 CONTACT WITH AND (SUSPECTED) EXPOSURE TO OTHER VIRAL COMMUNICABLE DISEASES: ICD-10-CM

## 2021-03-11 LAB — SARS-COV-2 RNA SPEC QL NAA+PROBE: SIGNIFICANT CHANGE UP

## 2021-03-11 PROCEDURE — C9803: CPT

## 2021-03-11 PROCEDURE — U0003: CPT

## 2021-03-11 PROCEDURE — U0005: CPT

## 2021-03-12 DIAGNOSIS — Z20.828 CONTACT WITH AND (SUSPECTED) EXPOSURE TO OTHER VIRAL COMMUNICABLE DISEASES: ICD-10-CM

## 2021-03-19 ENCOUNTER — NON-APPOINTMENT (OUTPATIENT)
Age: 46
End: 2021-03-19

## 2021-03-29 ENCOUNTER — APPOINTMENT (OUTPATIENT)
Dept: OBGYN | Facility: CLINIC | Age: 46
End: 2021-03-29
Payer: MEDICAID

## 2021-03-29 DIAGNOSIS — Z30.430 ENCOUNTER FOR INSERTION OF INTRAUTERINE CONTRACEPTIVE DEVICE: ICD-10-CM

## 2021-03-29 LAB
HCG UR QL: NEGATIVE
QUALITY CONTROL: YES

## 2021-03-29 PROCEDURE — 81025 URINE PREGNANCY TEST: CPT

## 2021-03-29 PROCEDURE — 58300 INSERT INTRAUTERINE DEVICE: CPT

## 2021-03-29 PROCEDURE — 99072 ADDL SUPL MATRL&STAF TM PHE: CPT

## 2021-03-29 PROCEDURE — 64435 NJX AA&/STRD PARACRV NRV: CPT

## 2021-03-29 NOTE — PROCEDURE
[IUD Placement] : intrauterine device (IUD) placement [Time out performed] : Pre-procedure time out performed.  Patient's name, date of birth and procedure confirmed. [Consent Obtained] : Consent obtained [Prevention of Pregnancy] : prevention of pregnancy [Risks] : risks [Benefits] : benefits [Alternatives] : alternatives [Patient] : patient [Infection] : infection [Bleeding] : bleeding [Pain] : pain [Expulsion] : expulsion [Failure] : failure [Uterine Perforation] : uterine perforation [Neg Pregnancy Test] : negative pregnancy test [Cytotec] : Cytotec [Betadine] : Betadine [Tenaculum] : Tenaculum [Easy Passage] : Easy passage [Sounded to ___ cm] : sounded to [unfilled] ~Ucm [Mirena IUD] : Mirena IUD [Tolerated Well] : Patient tolerated the procedure well [No Complications] : No complications [Motrin/Ibuprofen] : Motrin/Ibuprofen [de-identified] : iUD inserted without complication under sterile technique [de-identified] : 3/26

## 2021-04-05 ENCOUNTER — OUTPATIENT (OUTPATIENT)
Dept: OUTPATIENT SERVICES | Facility: HOSPITAL | Age: 46
LOS: 1 days | End: 2021-04-05
Payer: MEDICAID

## 2021-04-05 DIAGNOSIS — Z20.828 CONTACT WITH AND (SUSPECTED) EXPOSURE TO OTHER VIRAL COMMUNICABLE DISEASES: ICD-10-CM

## 2021-04-05 PROCEDURE — U0005: CPT

## 2021-04-05 PROCEDURE — U0003: CPT

## 2021-04-05 PROCEDURE — C9803: CPT

## 2021-04-06 DIAGNOSIS — Z20.828 CONTACT WITH AND (SUSPECTED) EXPOSURE TO OTHER VIRAL COMMUNICABLE DISEASES: ICD-10-CM

## 2021-04-12 ENCOUNTER — OUTPATIENT (OUTPATIENT)
Dept: OUTPATIENT SERVICES | Facility: HOSPITAL | Age: 46
LOS: 1 days | End: 2021-04-12
Payer: MEDICAID

## 2021-04-12 DIAGNOSIS — Z20.828 CONTACT WITH AND (SUSPECTED) EXPOSURE TO OTHER VIRAL COMMUNICABLE DISEASES: ICD-10-CM

## 2021-04-12 PROCEDURE — C9803: CPT

## 2021-04-12 PROCEDURE — U0003: CPT

## 2021-04-12 PROCEDURE — U0005: CPT

## 2021-04-13 DIAGNOSIS — Z20.828 CONTACT WITH AND (SUSPECTED) EXPOSURE TO OTHER VIRAL COMMUNICABLE DISEASES: ICD-10-CM

## 2021-04-13 LAB — SARS-COV-2 RNA SPEC QL NAA+PROBE: SIGNIFICANT CHANGE UP

## 2021-04-27 ENCOUNTER — NON-APPOINTMENT (OUTPATIENT)
Age: 46
End: 2021-04-27

## 2021-04-27 ENCOUNTER — APPOINTMENT (OUTPATIENT)
Dept: ULTRASOUND IMAGING | Facility: CLINIC | Age: 46
End: 2021-04-27
Payer: MEDICAID

## 2021-04-27 ENCOUNTER — OUTPATIENT (OUTPATIENT)
Dept: OUTPATIENT SERVICES | Facility: HOSPITAL | Age: 46
LOS: 1 days | End: 2021-04-27
Payer: MEDICAID

## 2021-04-27 ENCOUNTER — APPOINTMENT (OUTPATIENT)
Dept: OBGYN | Facility: CLINIC | Age: 46
End: 2021-04-27
Payer: MEDICAID

## 2021-04-27 DIAGNOSIS — Z97.5 PRESENCE OF (INTRAUTERINE) CONTRACEPTIVE DEVICE: ICD-10-CM

## 2021-04-27 DIAGNOSIS — R10.2 PELVIC AND PERINEAL PAIN: ICD-10-CM

## 2021-04-27 PROCEDURE — 76856 US EXAM PELVIC COMPLETE: CPT | Mod: 26

## 2021-04-27 PROCEDURE — 76830 TRANSVAGINAL US NON-OB: CPT | Mod: 26

## 2021-04-27 PROCEDURE — 76830 TRANSVAGINAL US NON-OB: CPT

## 2021-04-27 PROCEDURE — 76856 US EXAM PELVIC COMPLETE: CPT

## 2021-04-27 PROCEDURE — 99212 OFFICE O/P EST SF 10 MIN: CPT

## 2021-04-27 PROCEDURE — 99072 ADDL SUPL MATRL&STAF TM PHE: CPT

## 2021-04-27 NOTE — PHYSICAL EXAM
[Appropriately responsive] : appropriately responsive [Alert] : alert [No Acute Distress] : no acute distress [No Lymphadenopathy] : no lymphadenopathy [Oriented x3] : oriented x3 [Labia Majora] : normal [Labia Minora] : normal [Normal] :  normal [Scant] : There was scant vaginal bleeding [Old Blood] : old blood was present in the vagina [IUD String] : an IUD string was noted [FreeTextEntry5] : IUD string appears to be about 5cm

## 2021-04-27 NOTE — HISTORY OF PRESENT ILLNESS
[FreeTextEntry1] : Cynthia presents today with c/o new onset of pain with intercourse for the past two weeks. She had the Mirena IUD inserted on 3/29/21 and states that she bled up until about a 2week ago. She states she had no pelvic pain or cramping a few days after the insertion but the pain with sex started about 2 weeks ago.

## 2021-04-27 NOTE — DISCUSSION/SUMMARY
[FreeTextEntry1] : 1) pt given Rx for pelvic imaging for evaluation of IUD placement\par \par Will f/u pending receipt of resuts\par \par 15 minutes were spent face to face in this visit with greater than 50% of the time spent counseling\par

## 2021-04-28 ENCOUNTER — NON-APPOINTMENT (OUTPATIENT)
Age: 46
End: 2021-04-28

## 2021-05-03 ENCOUNTER — OUTPATIENT (OUTPATIENT)
Dept: OUTPATIENT SERVICES | Facility: HOSPITAL | Age: 46
LOS: 1 days | End: 2021-05-03
Payer: MEDICAID

## 2021-05-03 DIAGNOSIS — Z20.828 CONTACT WITH AND (SUSPECTED) EXPOSURE TO OTHER VIRAL COMMUNICABLE DISEASES: ICD-10-CM

## 2021-05-03 LAB — SARS-COV-2 RNA SPEC QL NAA+PROBE: SIGNIFICANT CHANGE UP

## 2021-05-03 PROCEDURE — U0005: CPT

## 2021-05-03 PROCEDURE — U0003: CPT

## 2021-05-03 PROCEDURE — C9803: CPT

## 2021-05-04 ENCOUNTER — APPOINTMENT (OUTPATIENT)
Dept: OBGYN | Facility: CLINIC | Age: 46
End: 2021-05-04
Payer: MEDICAID

## 2021-05-04 VITALS
SYSTOLIC BLOOD PRESSURE: 100 MMHG | HEIGHT: 63 IN | OXYGEN SATURATION: 97 % | BODY MASS INDEX: 23.04 KG/M2 | RESPIRATION RATE: 16 BRPM | WEIGHT: 130 LBS | DIASTOLIC BLOOD PRESSURE: 62 MMHG | HEART RATE: 66 BPM

## 2021-05-04 DIAGNOSIS — Z20.828 CONTACT WITH AND (SUSPECTED) EXPOSURE TO OTHER VIRAL COMMUNICABLE DISEASES: ICD-10-CM

## 2021-05-04 DIAGNOSIS — Z01.419 ENCOUNTER FOR GYNECOLOGICAL EXAMINATION (GENERAL) (ROUTINE) W/OUT ABNORMAL FINDINGS: ICD-10-CM

## 2021-05-04 LAB
HCG UR QL: NEGATIVE
QUALITY CONTROL: YES

## 2021-05-04 PROCEDURE — 99072 ADDL SUPL MATRL&STAF TM PHE: CPT

## 2021-05-04 PROCEDURE — 81025 URINE PREGNANCY TEST: CPT

## 2021-05-04 PROCEDURE — 99396 PREV VISIT EST AGE 40-64: CPT

## 2021-05-04 NOTE — HISTORY OF PRESENT ILLNESS
[FreeTextEntry1] : Patient is a 45-year-old female presents for an annual gynecologic examination. Patient has a Mirena IUD in place for 5 weeks. Patient states she had some cramping and has some external vaginal irritation. Patient denies any abnormal vaginal discharge or bleeding. Patient will be prescribed Lotrisone cream and further treatment pending results of cultures that will be done. Patient advised to take ibuprofen for cramping. Patient had a recent pelvic ultrasound showed the IUD to be in proper placement and position. Patient's last mammogram was October 2020

## 2021-05-04 NOTE — PHYSICAL EXAM
[Appropriately responsive] : appropriately responsive [Alert] : alert [No Acute Distress] : no acute distress [No Lymphadenopathy] : no lymphadenopathy [Regular Rate Rhythm] : regular rate rhythm [No Murmurs] : no murmurs [Clear to Auscultation B/L] : clear to auscultation bilaterally [Soft] : soft [Non-tender] : non-tender [Non-distended] : non-distended [No HSM] : No HSM [No Lesions] : no lesions [No Mass] : no mass [Oriented x3] : oriented x3 [FreeTextEntry6] : bilateral fibrocystic breast changes noted, no masses palpated, no skin changes or nipple discharge, axilla negative [Examination Of The Breasts] : a normal appearance [No Masses] : no breast masses were palpable [Labia Majora] : normal [Labia Minora] : normal [IUD String] : an IUD string was noted [Normal] : normal [Uterine Adnexae] : normal [No Tenderness] : no tenderness [Nl Sphincter Tone] : normal sphincter tone [FreeTextEntry4] : cultures done [FreeTextEntry5] : Pap done [FreeTextEntry9] : Hemoccult negative

## 2021-05-05 ENCOUNTER — NON-APPOINTMENT (OUTPATIENT)
Age: 46
End: 2021-05-05

## 2021-05-05 LAB
C TRACH RRNA SPEC QL NAA+PROBE: NOT DETECTED
CANDIDA VAG CYTO: NOT DETECTED
G VAGINALIS+PREV SP MTYP VAG QL MICRO: NOT DETECTED
HPV HIGH+LOW RISK DNA PNL CVX: NOT DETECTED
N GONORRHOEA RRNA SPEC QL NAA+PROBE: NOT DETECTED
SOURCE TP AMPLIFICATION: NORMAL
T VAGINALIS VAG QL WET PREP: NOT DETECTED

## 2021-05-07 LAB — BACTERIA GENITAL AEROBE CULT: NORMAL

## 2021-05-13 NOTE — ED STATDOCS - MUSCULOSKELETAL, MLM
PRE-OP DIAGNOSIS:  Severe aortic stenosis 13-May-2021 15:05:12  Jalil Sen  
range of motion is not limited and there is no muscle tenderness.

## 2021-05-17 ENCOUNTER — OUTPATIENT (OUTPATIENT)
Dept: OUTPATIENT SERVICES | Facility: HOSPITAL | Age: 46
LOS: 1 days | End: 2021-05-17
Payer: MEDICAID

## 2021-05-17 ENCOUNTER — APPOINTMENT (OUTPATIENT)
Dept: INTERNAL MEDICINE | Facility: CLINIC | Age: 46
End: 2021-05-17
Payer: MEDICAID

## 2021-05-17 VITALS
DIASTOLIC BLOOD PRESSURE: 56 MMHG | TEMPERATURE: 96.3 F | BODY MASS INDEX: 23.04 KG/M2 | OXYGEN SATURATION: 99 % | SYSTOLIC BLOOD PRESSURE: 98 MMHG | WEIGHT: 130 LBS | RESPIRATION RATE: 14 BRPM | HEIGHT: 63 IN | HEART RATE: 65 BPM

## 2021-05-17 DIAGNOSIS — Z20.828 CONTACT WITH AND (SUSPECTED) EXPOSURE TO OTHER VIRAL COMMUNICABLE DISEASES: ICD-10-CM

## 2021-05-17 LAB — SARS-COV-2 RNA SPEC QL NAA+PROBE: SIGNIFICANT CHANGE UP

## 2021-05-17 PROCEDURE — U0005: CPT

## 2021-05-17 PROCEDURE — 99213 OFFICE O/P EST LOW 20 MIN: CPT

## 2021-05-17 PROCEDURE — U0003: CPT

## 2021-05-17 PROCEDURE — 99072 ADDL SUPL MATRL&STAF TM PHE: CPT

## 2021-05-17 PROCEDURE — C9803: CPT

## 2021-05-17 NOTE — REVIEW OF SYSTEMS
[Fever] : no fever [Chills] : no chills [Night Sweats] : no night sweats [Joint Pain] : joint pain [Skin Rash] : no skin rash

## 2021-05-17 NOTE — HISTORY OF PRESENT ILLNESS
[FreeTextEntry8] : 45F presents for painful left forearm for 1 week. Denies trauma or inciting events. Of note, has hx of lupus. Denies fever, chills.

## 2021-05-17 NOTE — PHYSICAL EXAM
[No Acute Distress] : no acute distress [Normal Sclera/Conjunctiva] : normal sclera/conjunctiva [PERRL] : pupils equal round and reactive to light [EOMI] : extraocular movements intact [de-identified] : subcentimeter mobile nodule over left lateral forearm with mild TTP

## 2021-05-18 DIAGNOSIS — Z20.828 CONTACT WITH AND (SUSPECTED) EXPOSURE TO OTHER VIRAL COMMUNICABLE DISEASES: ICD-10-CM

## 2021-05-21 ENCOUNTER — OUTPATIENT (OUTPATIENT)
Dept: OUTPATIENT SERVICES | Facility: HOSPITAL | Age: 46
LOS: 1 days | End: 2021-05-21
Payer: MEDICAID

## 2021-05-21 ENCOUNTER — APPOINTMENT (OUTPATIENT)
Dept: ULTRASOUND IMAGING | Facility: CLINIC | Age: 46
End: 2021-05-21
Payer: MEDICAID

## 2021-05-21 DIAGNOSIS — R22.9 LOCALIZED SWELLING, MASS AND LUMP, UNSPECIFIED: ICD-10-CM

## 2021-05-21 DIAGNOSIS — Z00.8 ENCOUNTER FOR OTHER GENERAL EXAMINATION: ICD-10-CM

## 2021-05-21 PROCEDURE — 76882 US LMTD JT/FCL EVL NVASC XTR: CPT

## 2021-05-21 PROCEDURE — 76882 US LMTD JT/FCL EVL NVASC XTR: CPT | Mod: 26,LT

## 2021-06-17 NOTE — ED STATDOCS - PROGRESS NOTE DETAILS
Office Visit - Follow Up   Claudio Chambers   66 y.o. male    Date of Visit: 4/17/2018    Chief Complaint   Patient presents with     Influenza        Assessment and Plan   1. Screen for colon cancer  - Ambulatory referral for Colonoscopy    2. Body aches  Influenza B testing positive.  Discussed symptomatic measures including plenty of rest, fluid.  Acetaminophen for fever and pain control.  Discuss option of antiviral medication and he is interested in Tamiflu and we will start this especially given his comorbidities  - Influenza A/B Rapid Test    3. Influenza B    4. Left ventricular thrombus following MI  He is on anticoagulation and therefore I advised against use of nonsteroidal anti-inflammatory drugs    5. Chronic systolic CHF (congestive heart failure), NYHA class 2  Appears euvolemic, no evidence of congestive heart failure    Return in about 4 weeks (around 5/15/2018) for follow up with PCP.     History of Present Illness   This 66 y.o. old comes in for 3-4 days of fever, body aches, chills, sore throat, nasal congestion, cough.  Came on suddenly.  Have not improved may be worsened slightly.  No GI symptoms including no nausea vomiting or diarrhea.  Tolerating oral intake.  He has no shortness of breath.  No orthopnea.  No chest pain or chest tightness.  No lightheadedness or dizziness.  No sick contacts.  Lives alone.  Works for the [employer].    Review of Systems: A comprehensive review of systems was negative except as noted.     Medications, Allergies and Problem List   Reviewed and updated     Physical Exam   General Appearance:   No acute distress    /58 (Patient Site: Left Arm, Patient Position: Sitting, Cuff Size: Adult Regular)  Pulse 75  Temp 98.3  F (36.8  C) (Oral)   Ht 6' (1.829 m)  Wt 216 lb (98 kg)  SpO2 96%  BMI 29.29 kg/m2    HEENT exam is unremarkable  Neck supple no thyromegaly or nodule palpable  Lymphatic no cervical lymphadenopathy  Cardiovascular regular rate  and rhythm no murmur gallop or rub  Pulmonary lungs are clear to auscultation bilaterally  Gastrointestinall abdomen soft nontender nondistended no organomegaly  Neurologic exam is non focal  Psychiatric pleasant, no confusion or agitation        Additional Information   Current Outpatient Prescriptions   Medication Sig Dispense Refill     aspirin 81 MG EC tablet Take 81 mg by mouth daily.       atorvastatin (LIPITOR) 40 MG tablet Take 1 tablet (40 mg total) by mouth daily. 90 tablet 0     carvedilol (COREG) 6.25 MG tablet Take 1 tablet (6.25 mg total) by mouth 2 (two) times a day. 180 tablet 1     COQ10, UBIQUINOL, ORAL Take 2 capsules by mouth daily.       DOCOSAHEXANOIC ACID/EPA (FISH OIL ORAL) Take 4 capsules by mouth daily.       flaxseed Liqd A couple of ounce every morning per pt-- OTC       lisinopril (PRINIVIL,ZESTRIL) 10 MG tablet Take 1 tablet (10 mg total) by mouth daily. 90 tablet 3     nitroglycerin (NITROSTAT) 0.4 MG SL tablet Place 1 tablet (0.4 mg total) under the tongue as needed. 25 tablet 11     warfarin (COUMADIN) 2.5 MG tablet Take 5 mg Monday and Friday and 2.5 mg all other days. 180 tablet 0     oseltamivir (TAMIFLU) 75 MG capsule Take 1 capsule (75 mg total) by mouth 2 (two) times a day for 5 days. 10 capsule 0     No current facility-administered medications for this visit.      No Known Allergies  Social History   Substance Use Topics     Smoking status: Current Every Day Smoker     Packs/day: 0.50     Types: Cigarettes     Smokeless tobacco: Never Used     Alcohol use Yes      Comment: Rare       Review and/or order of clinical lab tests:  Review and/or order of radiology tests:  Review and/or order of medicine tests:  Discussion of test results with performing physician:  Decision to obtain old records and/or obtain history from someone other than the patient:  Review and summarization of old records and/or obtaining history from someone other than the patient and.or discussion of case  with another health care provider:  Independent visualization of image, tracing or specimen itself:    Time:      August Medeiros MD   43 y/o F with PMH of lupus, RA presents with sore throat x 2 days. +chills at home x 5 days. +sick contacts who were confirmed strep +. Denies cough. States she is unable to tolerate tylenol and motrin due to stomach irritation. PE: Well appearing. Cardiac: s1s2, RRR. Lungs: CTAB. Abdomen: NBS x4, soft, nontender. HEENT: +oropharyngeal erythema without tonsilar enlargement/exudates. No uvular deviation. No submandibular lymphadenopathy. A/P; r/o strep throat. Plan for rapid strep and reassess. - Tomas Ferris PA-C

## 2021-07-10 ENCOUNTER — RX RENEWAL (OUTPATIENT)
Age: 46
End: 2021-07-10

## 2021-08-24 ENCOUNTER — APPOINTMENT (OUTPATIENT)
Dept: DERMATOLOGY | Facility: CLINIC | Age: 46
End: 2021-08-24

## 2021-08-31 ENCOUNTER — RESULT REVIEW (OUTPATIENT)
Age: 46
End: 2021-08-31

## 2021-08-31 ENCOUNTER — APPOINTMENT (OUTPATIENT)
Dept: INTERNAL MEDICINE | Facility: CLINIC | Age: 46
End: 2021-08-31
Payer: MEDICAID

## 2021-08-31 VITALS
DIASTOLIC BLOOD PRESSURE: 66 MMHG | OXYGEN SATURATION: 99 % | SYSTOLIC BLOOD PRESSURE: 100 MMHG | RESPIRATION RATE: 14 BRPM | TEMPERATURE: 97.6 F | HEART RATE: 72 BPM

## 2021-08-31 PROCEDURE — 99213 OFFICE O/P EST LOW 20 MIN: CPT

## 2021-08-31 NOTE — HISTORY OF PRESENT ILLNESS
[FreeTextEntry8] : 45F presents for 1 week of b/l LE swelling and calf pain. Denies paresthesia or weakness. Activity makes it better. Of note, has SLE. Is concerned about DVT. \par \par

## 2021-08-31 NOTE — REVIEW OF SYSTEMS
[Lower Ext Edema] : lower extremity edema [Muscle Pain] : muscle pain [Fever] : no fever [Chills] : no chills [Night Sweats] : no night sweats [Chest Pain] : no chest pain [Shortness Of Breath] : no shortness of breath [Palpitations] : no palpitations [Wheezing] : no wheezing [Cough] : no cough [Dyspnea on Exertion] : no dyspnea on exertion [Abdominal Pain] : no abdominal pain [Nausea] : no nausea [Constipation] : no constipation [Diarrhea] : diarrhea [Vomiting] : no vomiting [Dysuria] : no dysuria

## 2021-08-31 NOTE — HEALTH RISK ASSESSMENT
[1/2 of Days or More (2)] : 3.) Trouble falling asleep, or sleeping too much? Half the days or more [Nearly Every Day (3)] : 4.) Feeling tired or having little energy? Nearly every day [Not at All (0)] : 8.) Moving or speaking so slowly that other people could have noticed, or the opposite, moving or speaking faster than usual? Not at all [Mild] : severity of depression is mild [Somewhat Difficult] : How difficult have these problems made it for you to do your work, take care of things at home, or get along with people? Somewhat difficult [PHQ-9 Positive] : PHQ-9 Positive [I have developed a follow-up plan documented below in the note.] : I have developed a follow-up plan documented below in the note. [[id number]] : 9

## 2021-08-31 NOTE — PHYSICAL EXAM
[No Acute Distress] : no acute distress [Normal Sclera/Conjunctiva] : normal sclera/conjunctiva [PERRL] : pupils equal round and reactive to light [EOMI] : extraocular movements intact [No Joint Swelling] : no joint swelling [Grossly Normal Strength/Tone] : grossly normal strength/tone [de-identified] : trace b/l LE edema [de-identified] : TTP left calf

## 2021-09-01 ENCOUNTER — APPOINTMENT (OUTPATIENT)
Dept: ULTRASOUND IMAGING | Facility: CLINIC | Age: 46
End: 2021-09-01
Payer: MEDICAID

## 2021-09-01 ENCOUNTER — OUTPATIENT (OUTPATIENT)
Dept: OUTPATIENT SERVICES | Facility: HOSPITAL | Age: 46
LOS: 1 days | End: 2021-09-01
Payer: MEDICAID

## 2021-09-01 DIAGNOSIS — M32.9 SYSTEMIC LUPUS ERYTHEMATOSUS, UNSPECIFIED: ICD-10-CM

## 2021-09-01 DIAGNOSIS — M79.89 OTHER SPECIFIED SOFT TISSUE DISORDERS: ICD-10-CM

## 2021-09-01 PROCEDURE — 93970 EXTREMITY STUDY: CPT

## 2021-09-01 PROCEDURE — 93970 EXTREMITY STUDY: CPT | Mod: 26

## 2021-09-21 ENCOUNTER — APPOINTMENT (OUTPATIENT)
Dept: OTOLARYNGOLOGY | Facility: CLINIC | Age: 46
End: 2021-09-21
Payer: MEDICAID

## 2021-09-21 VITALS
BODY MASS INDEX: 23.04 KG/M2 | WEIGHT: 130 LBS | HEIGHT: 63 IN | HEART RATE: 72 BPM | TEMPERATURE: 98.2 F | SYSTOLIC BLOOD PRESSURE: 100 MMHG | DIASTOLIC BLOOD PRESSURE: 66 MMHG

## 2021-09-21 PROCEDURE — 99213 OFFICE O/P EST LOW 20 MIN: CPT

## 2021-09-21 NOTE — HISTORY OF PRESENT ILLNESS
[de-identified] : Still c/o itchy ears and occ feeling of ? small pimples in ear.  No help with flucinolone ointment.

## 2021-09-21 NOTE — PHYSICAL EXAM
[Normal] : palatine tonsils are normal [Laryngoscopy Performed] : laryngoscopy was performed, see procedure section for findings [de-identified] : dry eac bilat - no infection - unchanged  [de-identified] : indirect exam - mod post glottic edema and interarytenoid edema  - not seen well

## 2021-09-21 NOTE — ASSESSMENT
[FreeTextEntry1] : Patient with continued ear itch and finding of eczema of eac.  No help with flucinolone - findings essentially unchanged - recommended change to mometasone ointment once a day and follow up in one month and as necessary  - if still issue may consider adding nystatin creme.

## 2021-10-18 DIAGNOSIS — Z12.39 ENCOUNTER FOR OTHER SCREENING FOR MALIGNANT NEOPLASM OF BREAST: ICD-10-CM

## 2021-10-21 ENCOUNTER — APPOINTMENT (OUTPATIENT)
Dept: OTOLARYNGOLOGY | Facility: CLINIC | Age: 46
End: 2021-10-21

## 2021-10-22 NOTE — ED STATDOCS - PMH
Lupus    RA (rheumatoid arthritis)    Sciatica

## 2021-11-02 ENCOUNTER — APPOINTMENT (OUTPATIENT)
Dept: INTERNAL MEDICINE | Facility: CLINIC | Age: 46
End: 2021-11-02
Payer: MEDICAID

## 2021-11-02 VITALS
HEART RATE: 61 BPM | WEIGHT: 134 LBS | DIASTOLIC BLOOD PRESSURE: 70 MMHG | SYSTOLIC BLOOD PRESSURE: 120 MMHG | TEMPERATURE: 97.3 F | RESPIRATION RATE: 14 BRPM | OXYGEN SATURATION: 99 % | BODY MASS INDEX: 23.74 KG/M2 | HEIGHT: 63 IN

## 2021-11-02 DIAGNOSIS — Z97.5 PRESENCE OF (INTRAUTERINE) CONTRACEPTIVE DEVICE: ICD-10-CM

## 2021-11-02 DIAGNOSIS — Z87.42 PERSONAL HISTORY OF OTHER DISEASES OF THE FEMALE GENITAL TRACT: ICD-10-CM

## 2021-11-02 DIAGNOSIS — H60.543 ACUTE ECZEMATOID OTITIS EXTERNA, BILATERAL: ICD-10-CM

## 2021-11-02 DIAGNOSIS — R39.9 UNSPECIFIED SYMPTOMS AND SIGNS INVOLVING THE GENITOURINARY SYSTEM: ICD-10-CM

## 2021-11-02 DIAGNOSIS — Z86.79 PERSONAL HISTORY OF OTHER DISEASES OF THE CIRCULATORY SYSTEM: ICD-10-CM

## 2021-11-02 DIAGNOSIS — M51.36 OTHER INTERVERTEBRAL DISC DEGENERATION, LUMBAR REGION: ICD-10-CM

## 2021-11-02 DIAGNOSIS — Z01.419 ENCOUNTER FOR GYNECOLOGICAL EXAMINATION (GENERAL) (ROUTINE) W/OUT ABNORMAL FINDINGS: ICD-10-CM

## 2021-11-02 DIAGNOSIS — M50.90 CERVICAL DISC DISORDER, UNSPECIFIED, UNSPECIFIED CERVICAL REGION: ICD-10-CM

## 2021-11-02 DIAGNOSIS — L29.9 PRURITUS, UNSPECIFIED: ICD-10-CM

## 2021-11-02 DIAGNOSIS — M54.9 DORSALGIA, UNSPECIFIED: ICD-10-CM

## 2021-11-02 DIAGNOSIS — M54.50 LOW BACK PAIN, UNSPECIFIED: ICD-10-CM

## 2021-11-02 DIAGNOSIS — M54.2 CERVICALGIA: ICD-10-CM

## 2021-11-02 DIAGNOSIS — N36.8 OTHER SPECIFIED DISORDERS OF URETHRA: ICD-10-CM

## 2021-11-02 DIAGNOSIS — M51.24 OTHER INTERVERTEBRAL DISC DISPLACEMENT, THORACIC REGION: ICD-10-CM

## 2021-11-02 DIAGNOSIS — M79.89 OTHER SPECIFIED SOFT TISSUE DISORDERS: ICD-10-CM

## 2021-11-02 DIAGNOSIS — Z20.822 CONTACT WITH AND (SUSPECTED) EXPOSURE TO COVID-19: ICD-10-CM

## 2021-11-02 DIAGNOSIS — R07.0 PAIN IN THROAT: ICD-10-CM

## 2021-11-02 DIAGNOSIS — E03.9 HYPOTHYROIDISM, UNSPECIFIED: ICD-10-CM

## 2021-11-02 DIAGNOSIS — R49.0 DYSPHONIA: ICD-10-CM

## 2021-11-02 DIAGNOSIS — Z30.430 ENCOUNTER FOR INSERTION OF INTRAUTERINE CONTRACEPTIVE DEVICE: ICD-10-CM

## 2021-11-02 DIAGNOSIS — R10.11 RIGHT UPPER QUADRANT PAIN: ICD-10-CM

## 2021-11-02 DIAGNOSIS — Z30.432 ENCOUNTER FOR REMOVAL OF INTRAUTERINE CONTRACEPTIVE DEVICE: ICD-10-CM

## 2021-11-02 DIAGNOSIS — J32.0 CHRONIC MAXILLARY SINUSITIS: ICD-10-CM

## 2021-11-02 DIAGNOSIS — M51.34 OTHER INTERVERTEBRAL DISC DEGENERATION, THORACIC REGION: ICD-10-CM

## 2021-11-02 DIAGNOSIS — Z23 ENCOUNTER FOR IMMUNIZATION: ICD-10-CM

## 2021-11-02 PROCEDURE — 90715 TDAP VACCINE 7 YRS/> IM: CPT

## 2021-11-02 PROCEDURE — 90471 IMMUNIZATION ADMIN: CPT

## 2021-11-02 PROCEDURE — 99396 PREV VISIT EST AGE 40-64: CPT | Mod: 25

## 2021-11-02 RX ORDER — CLOTRIMAZOLE AND BETAMETHASONE DIPROPIONATE 10; .5 MG/G; MG/G
1-0.05 CREAM TOPICAL TWICE DAILY
Qty: 1 | Refills: 1 | Status: COMPLETED | COMMUNITY
Start: 2021-05-04 | End: 2021-11-02

## 2021-11-02 RX ORDER — MISOPROSTOL 200 UG/1
200 TABLET ORAL
Qty: 2 | Refills: 0 | Status: COMPLETED | COMMUNITY
Start: 2021-03-09 | End: 2021-11-02

## 2021-11-02 RX ORDER — OMEPRAZOLE 40 MG/1
40 CAPSULE, DELAYED RELEASE ORAL
Qty: 90 | Refills: 0 | Status: COMPLETED | COMMUNITY
Start: 2020-04-20 | End: 2021-11-02

## 2021-11-02 RX ORDER — OMEPRAZOLE 40 MG/1
40 CAPSULE, DELAYED RELEASE ORAL
Qty: 90 | Refills: 1 | Status: COMPLETED | COMMUNITY
Start: 2020-11-30 | End: 2021-11-02

## 2021-11-02 RX ORDER — FLUOCINOLONE ACETONIDE 0.25 MG/G
0.03 OINTMENT TOPICAL TWICE DAILY
Qty: 1 | Refills: 2 | Status: COMPLETED | COMMUNITY
Start: 2020-11-30 | End: 2021-11-02

## 2021-11-02 RX ORDER — MOMETASONE FUROATE 1 MG/G
0.1 OINTMENT TOPICAL DAILY
Qty: 1 | Refills: 0 | Status: COMPLETED | COMMUNITY
Start: 2021-09-21 | End: 2021-11-02

## 2021-11-02 RX ORDER — AZITHROMYCIN 250 MG/1
250 TABLET, FILM COATED ORAL
Qty: 6 | Refills: 0 | Status: COMPLETED | COMMUNITY
Start: 2019-11-30 | End: 2021-11-02

## 2021-11-02 RX ORDER — OMEPRAZOLE 40 MG/1
40 CAPSULE, DELAYED RELEASE ORAL
Qty: 90 | Refills: 0 | Status: COMPLETED | COMMUNITY
Start: 2020-03-31 | End: 2021-11-02

## 2021-11-02 RX ORDER — FLUTICASONE PROPIONATE 0.05 MG/G
0.01 OINTMENT TOPICAL
Qty: 1 | Refills: 0 | Status: COMPLETED | COMMUNITY
Start: 2020-03-13 | End: 2021-11-02

## 2021-11-02 NOTE — REVIEW OF SYSTEMS
[Itching] : Itching [Fever] : no fever [Chills] : no chills [Night Sweats] : no night sweats [Discharge] : no discharge [Vision Problems] : no vision problems [Itching] : no itching [Earache] : no earache [Nasal Discharge] : no nasal discharge [Sore Throat] : no sore throat [Chest Pain] : no chest pain [Palpitations] : no palpitations [Lower Ext Edema] : no lower extremity edema [Shortness Of Breath] : no shortness of breath [Wheezing] : no wheezing [Cough] : no cough [Dyspnea on Exertion] : no dyspnea on exertion [Abdominal Pain] : no abdominal pain [Nausea] : no nausea [Constipation] : no constipation [Diarrhea] : diarrhea [Vomiting] : no vomiting [Dysuria] : no dysuria [Muscle Weakness] : no muscle weakness [Muscle Pain] : no muscle pain [Skin Rash] : no skin rash [Headache] : no headache [Dizziness] : no dizziness [Suicidal] : not suicidal [Anxiety] : no anxiety [Depression] : no depression [Easy Bleeding] : no easy bleeding [Easy Bruising] : no easy bruising [Swollen Glands] : no swollen glands

## 2021-11-02 NOTE — HEALTH RISK ASSESSMENT
[Good] : ~his/her~  mood as  good [1] : 1) Little interest or pleasure doing things for several days (1) [0] : 2) Feeling down, depressed, or hopeless: Not at all (0) [PHQ-2 Negative - No further assessment needed] : PHQ-2 Negative - No further assessment needed [Fully functional (bathing, dressing, toileting, transferring, walking, feeding)] : Fully functional (bathing, dressing, toileting, transferring, walking, feeding) [Fully functional (using the telephone, shopping, preparing meals, housekeeping, doing laundry, using] : Fully functional and needs no help or supervision to perform IADLs (using the telephone, shopping, preparing meals, housekeeping, doing laundry, using transportation, managing medications and managing finances) [] : No [[id number]] : 1 [Change in mental status noted] : No change in mental status noted [Reports changes in hearing] : Reports no changes in hearing [Reports changes in vision] : Reports no changes in vision

## 2021-11-02 NOTE — ASSESSMENT
[FreeTextEntry1] : Rheum: On plaquenil. \par HCM: Has mammogram pending. Up to date on pap smear. Check labs.

## 2021-11-03 LAB
25(OH)D3 SERPL-MCNC: 47.9 NG/ML
ALBUMIN SERPL ELPH-MCNC: 4.9 G/DL
ALP BLD-CCNC: 55 U/L
ALT SERPL-CCNC: 14 U/L
ANION GAP SERPL CALC-SCNC: 13 MMOL/L
AST SERPL-CCNC: 12 U/L
BASOPHILS # BLD AUTO: 0.04 K/UL
BASOPHILS NFR BLD AUTO: 0.6 %
BILIRUB SERPL-MCNC: 0.7 MG/DL
BUN SERPL-MCNC: 11 MG/DL
CALCIUM SERPL-MCNC: 9.6 MG/DL
CHLORIDE SERPL-SCNC: 102 MMOL/L
CHOLEST SERPL-MCNC: 185 MG/DL
CO2 SERPL-SCNC: 25 MMOL/L
CREAT SERPL-MCNC: 0.67 MG/DL
EOSINOPHIL # BLD AUTO: 0.13 K/UL
EOSINOPHIL NFR BLD AUTO: 2.1 %
ESTIMATED AVERAGE GLUCOSE: 97 MG/DL
FOLATE SERPL-MCNC: 17.7 NG/ML
GLUCOSE SERPL-MCNC: 87 MG/DL
HBA1C MFR BLD HPLC: 5 %
HCT VFR BLD CALC: 43.8 %
HDLC SERPL-MCNC: 63 MG/DL
HGB BLD-MCNC: 15.1 G/DL
IMM GRANULOCYTES NFR BLD AUTO: 0.2 %
LDLC SERPL CALC-MCNC: 111 MG/DL
LYMPHOCYTES # BLD AUTO: 1.64 K/UL
LYMPHOCYTES NFR BLD AUTO: 25.9 %
MAN DIFF?: NORMAL
MCHC RBC-ENTMCNC: 31.8 PG
MCHC RBC-ENTMCNC: 34.5 GM/DL
MCV RBC AUTO: 92.2 FL
MONOCYTES # BLD AUTO: 0.45 K/UL
MONOCYTES NFR BLD AUTO: 7.1 %
NEUTROPHILS # BLD AUTO: 4.05 K/UL
NEUTROPHILS NFR BLD AUTO: 64.1 %
NONHDLC SERPL-MCNC: 121 MG/DL
PLATELET # BLD AUTO: 186 K/UL
POTASSIUM SERPL-SCNC: 4.1 MMOL/L
PROT SERPL-MCNC: 7.5 G/DL
RBC # BLD: 4.75 M/UL
RBC # FLD: 12 %
SODIUM SERPL-SCNC: 140 MMOL/L
T3 SERPL-MCNC: 109 NG/DL
T4 FREE SERPL-MCNC: 1.3 NG/DL
TRIGL SERPL-MCNC: 50 MG/DL
TSH SERPL-ACNC: 0.66 UIU/ML
VIT B12 SERPL-MCNC: 467 PG/ML
WBC # FLD AUTO: 6.32 K/UL

## 2021-11-06 ENCOUNTER — APPOINTMENT (OUTPATIENT)
Dept: MAMMOGRAPHY | Facility: CLINIC | Age: 46
End: 2021-11-06
Payer: MEDICAID

## 2021-11-06 ENCOUNTER — OUTPATIENT (OUTPATIENT)
Dept: OUTPATIENT SERVICES | Facility: HOSPITAL | Age: 46
LOS: 1 days | End: 2021-11-06
Payer: MEDICAID

## 2021-11-06 ENCOUNTER — APPOINTMENT (OUTPATIENT)
Dept: ULTRASOUND IMAGING | Facility: CLINIC | Age: 46
End: 2021-11-06
Payer: MEDICAID

## 2021-11-06 ENCOUNTER — RESULT REVIEW (OUTPATIENT)
Age: 46
End: 2021-11-06

## 2021-11-06 DIAGNOSIS — Z12.39 ENCOUNTER FOR OTHER SCREENING FOR MALIGNANT NEOPLASM OF BREAST: ICD-10-CM

## 2021-11-06 PROCEDURE — 77063 BREAST TOMOSYNTHESIS BI: CPT | Mod: 26

## 2021-11-06 PROCEDURE — 77067 SCR MAMMO BI INCL CAD: CPT

## 2021-11-06 PROCEDURE — 77063 BREAST TOMOSYNTHESIS BI: CPT

## 2021-11-06 PROCEDURE — 77067 SCR MAMMO BI INCL CAD: CPT | Mod: 26

## 2021-11-06 PROCEDURE — 76641 ULTRASOUND BREAST COMPLETE: CPT | Mod: 26,50

## 2021-11-06 PROCEDURE — 76641 ULTRASOUND BREAST COMPLETE: CPT

## 2021-11-11 ENCOUNTER — TRANSCRIPTION ENCOUNTER (OUTPATIENT)
Age: 46
End: 2021-11-11

## 2021-11-11 LAB — SARS-COV-2 N GENE NPH QL NAA+PROBE: NOT DETECTED

## 2021-11-19 ENCOUNTER — NON-APPOINTMENT (OUTPATIENT)
Age: 46
End: 2021-11-19

## 2021-11-19 ENCOUNTER — APPOINTMENT (OUTPATIENT)
Dept: ORTHOPEDIC SURGERY | Facility: CLINIC | Age: 46
End: 2021-11-19
Payer: MEDICAID

## 2021-11-19 VITALS
WEIGHT: 130 LBS | DIASTOLIC BLOOD PRESSURE: 69 MMHG | HEART RATE: 62 BPM | BODY MASS INDEX: 23.04 KG/M2 | HEIGHT: 63 IN | SYSTOLIC BLOOD PRESSURE: 123 MMHG

## 2021-11-19 DIAGNOSIS — M54.16 RADICULOPATHY, LUMBAR REGION: ICD-10-CM

## 2021-11-19 DIAGNOSIS — M54.12 RADICULOPATHY, CERVICAL REGION: ICD-10-CM

## 2021-11-19 PROCEDURE — 72040 X-RAY EXAM NECK SPINE 2-3 VW: CPT

## 2021-11-19 PROCEDURE — 99213 OFFICE O/P EST LOW 20 MIN: CPT

## 2021-11-19 PROCEDURE — 72100 X-RAY EXAM L-S SPINE 2/3 VWS: CPT

## 2021-11-19 NOTE — ADDENDUM
[FreeTextEntry1] : Documented by Avni Douglas acting as a scribe for Emily Mendosa on 11/19/2021 . All medical record entries made by the Scribe were at my, Emily Mendosa, direction and personally dictated by me on 11/19/2021  . I have reviewed the chart and agree that the record accurately reflects my personal performance of the history, physical exam, assessment and plan. I have also personally directed, reviewed, and agreed with the chart.

## 2021-11-19 NOTE — DISCUSSION/SUMMARY
[de-identified] : We talked about the nature of the condition and treatment options. Anticipatory guidance regarding radiculopathy was given. A lumbar MRI has been ordered and is medically necessary due to persistent pain despite rheumatology guided treatment including oral medications including muscle relaxants and steroids since 10- with no significant decrease in pain, failure of conservative measures such as spine focused stretching modalities since 10- with no improvement, and acute radiculopathy. MRI will help guide treatment plan, possible surgical intervention vs injection therapy with pain management. An MRI is also necessary as there are sensory deficits which may require surgical intervention, an MRI is needed to begin possible surgical planning and arrest these neurological deficits before they progress or become permanent. The patient will follow up after the MRI results have been obtained. Patient is aware that if MRI is not approved a repeat course of PT may be required. \par \par Prior to appointment and during encounter with patient extensive medical records were reviewed including but not limited to, hospital records, out patient records, imaging results, and lab data. During this appointment the patient was examined, diagnoses were discussed and explained in a face to face manner. In addition extensive time was spent reviewing aforementioned diagnostic studies. Counseling including abnormal image results, differential diagnoses, treatment options, risk and benefits, lifestyle changes, current condition, and current medications was performed. Patient's comments, questions, and concerns were address and patient verbalized understanding. Based on this patient's presentation at our office, which is an orthopedic spine surgeon's office, this patient inherently / intrinsically has a risk, however minute, of developing  issues such as Cauda equina syndrome, bowel and bladder changes, or progression of motor or neurological deficits such as paralysis which may be permanent.

## 2021-11-19 NOTE — HISTORY OF PRESENT ILLNESS
[de-identified] : 45 year old F presents for an initial evaluation of neck and back pain, with an acute complaint of shooting pain down the BL lateral thighs into the BL calves and foot. This is a new complaint and was worked up for clots. At times pain is too intense too walk. She is actively taking Methocarbamol and steroids when pain is severe as well as autoimmune medication. Patient has + RA factor, lupus, and fibromyalgia. She actively is in treatment for these conditions.  [Ataxia] : no ataxia [Incontinence] : no incontinence [Loss of Dexterity] : good dexterity [Urinary Ret.] : no urinary retention

## 2021-11-19 NOTE — PHYSICAL EXAM
[Poor Appearance] : well-appearing [Acute Distress] : not in acute distress [Obese] : not obese [de-identified] : CONSTITUTIONAL: The patient is a very pleasant individual who is well-nourished and who appears stated age.\par PSYCHIATRIC: The patient is alert and oriented X 3 and in no apparent distress, and participates with orthopedic evaluation well.\par HEAD: Atraumatic and is nonsyndromic in appearance.\par EENT: No visible thyromegaly, EOMI.\par RESPIRATORY: Respiratory rate is regular, not dyspneic on examination.\par LYMPHATICS: There is no inguinal lymphadenopathy\par INTEGUMENTARY: Skin is clean, dry, and intact about the bilateral lower extremities and lumbar spine.\par VASCULAR: There is brisk capillary refill about the bilateral lower extremities.\par NEUROLOGIC: There are no pathologic reflexes. There is no decrease in sensation of the bilateral lower extremities on Wartenberg pinwheel examination. Deep tendon reflexes are well maintained at 2+/4 of the bilateral lower extremities and are symmetric.\par MUSCULOSKELETAL: There is no visible muscular atrophy. Manual motor strength is well maintained in the bilateral lower extremities. Range of motion of lumbar spine is well maintained. The patient ambulates in a non-myelopathic manner. Negative tension sign and straight leg raise bilaterally. Quad extension, ankle dorsiflexion, EHL, plantar flexion, and ankle eversion are well preserved. Normal secondary orthopaedic exam of bilateral hips, greater trochanteric area, knees and ankles. No pain with internal or external rotation of the bilateral hips.  [de-identified] : Xray of the lumbar spine taken 11/19/2021 demonstrates mild degenerative changes at L1-L2, all other findings age appropriate. \par \par Xray of a cervical spine taken 11/19/2021 demonstrates age appropriate cervical degenerative changes.

## 2021-11-30 ENCOUNTER — APPOINTMENT (OUTPATIENT)
Dept: DERMATOLOGY | Facility: CLINIC | Age: 46
End: 2021-11-30

## 2021-12-21 ENCOUNTER — APPOINTMENT (OUTPATIENT)
Dept: DERMATOLOGY | Facility: CLINIC | Age: 46
End: 2021-12-21

## 2021-12-31 ENCOUNTER — OUTPATIENT (OUTPATIENT)
Dept: OUTPATIENT SERVICES | Facility: HOSPITAL | Age: 46
LOS: 1 days | End: 2021-12-31
Payer: MEDICAID

## 2021-12-31 DIAGNOSIS — Z20.828 CONTACT WITH AND (SUSPECTED) EXPOSURE TO OTHER VIRAL COMMUNICABLE DISEASES: ICD-10-CM

## 2021-12-31 LAB — SARS-COV-2 RNA SPEC QL NAA+PROBE: SIGNIFICANT CHANGE UP

## 2021-12-31 PROCEDURE — U0005: CPT

## 2021-12-31 PROCEDURE — C9803: CPT

## 2021-12-31 PROCEDURE — U0003: CPT

## 2022-01-01 DIAGNOSIS — Z20.828 CONTACT WITH AND (SUSPECTED) EXPOSURE TO OTHER VIRAL COMMUNICABLE DISEASES: ICD-10-CM

## 2022-01-28 ENCOUNTER — EMERGENCY (EMERGENCY)
Facility: HOSPITAL | Age: 47
LOS: 0 days | Discharge: HOME | End: 2022-01-28
Attending: EMERGENCY MEDICINE
Payer: MEDICAID

## 2022-01-28 VITALS — WEIGHT: 130.07 LBS | HEIGHT: 63 IN

## 2022-01-28 VITALS
DIASTOLIC BLOOD PRESSURE: 68 MMHG | RESPIRATION RATE: 18 BRPM | OXYGEN SATURATION: 100 % | TEMPERATURE: 98 F | HEART RATE: 62 BPM | SYSTOLIC BLOOD PRESSURE: 125 MMHG

## 2022-01-28 DIAGNOSIS — R10.84 GENERALIZED ABDOMINAL PAIN: ICD-10-CM

## 2022-01-28 DIAGNOSIS — R06.9 UNSPECIFIED ABNORMALITIES OF BREATHING: ICD-10-CM

## 2022-01-28 DIAGNOSIS — N39.0 URINARY TRACT INFECTION, SITE NOT SPECIFIED: ICD-10-CM

## 2022-01-28 DIAGNOSIS — R39.9 UNSPECIFIED SYMPTOMS AND SIGNS INVOLVING THE GENITOURINARY SYSTEM: ICD-10-CM

## 2022-01-28 DIAGNOSIS — Z88.1 ALLERGY STATUS TO OTHER ANTIBIOTIC AGENTS STATUS: ICD-10-CM

## 2022-01-28 DIAGNOSIS — R30.0 DYSURIA: ICD-10-CM

## 2022-01-28 DIAGNOSIS — Z88.2 ALLERGY STATUS TO SULFONAMIDES: ICD-10-CM

## 2022-01-28 LAB
APPEARANCE UR: CLEAR — SIGNIFICANT CHANGE UP
BILIRUB UR-MCNC: NEGATIVE — SIGNIFICANT CHANGE UP
COLOR SPEC: YELLOW — SIGNIFICANT CHANGE UP
DIFF PNL FLD: NEGATIVE — SIGNIFICANT CHANGE UP
GLUCOSE UR QL: NEGATIVE — SIGNIFICANT CHANGE UP
KETONES UR-MCNC: NEGATIVE — SIGNIFICANT CHANGE UP
LEUKOCYTE ESTERASE UR-ACNC: NEGATIVE — SIGNIFICANT CHANGE UP
NITRITE UR-MCNC: NEGATIVE — SIGNIFICANT CHANGE UP
PH UR: 5 — SIGNIFICANT CHANGE UP (ref 5–8)
PROT UR-MCNC: NEGATIVE — SIGNIFICANT CHANGE UP
SP GR SPEC: 1.01 — SIGNIFICANT CHANGE UP (ref 1.01–1.02)
UROBILINOGEN FLD QL: NEGATIVE — SIGNIFICANT CHANGE UP

## 2022-01-28 PROCEDURE — 87086 URINE CULTURE/COLONY COUNT: CPT

## 2022-01-28 PROCEDURE — 81003 URINALYSIS AUTO W/O SCOPE: CPT

## 2022-01-28 PROCEDURE — 99284 EMERGENCY DEPT VISIT MOD MDM: CPT

## 2022-01-28 PROCEDURE — 99283 EMERGENCY DEPT VISIT LOW MDM: CPT

## 2022-01-28 RX ORDER — IBUPROFEN 200 MG
600 TABLET ORAL ONCE
Refills: 0 | Status: COMPLETED | OUTPATIENT
Start: 2022-01-28 | End: 2022-01-28

## 2022-01-28 RX ORDER — CEPHALEXIN 500 MG
500 CAPSULE ORAL ONCE
Refills: 0 | Status: COMPLETED | OUTPATIENT
Start: 2022-01-28 | End: 2022-01-28

## 2022-01-28 RX ORDER — IBUPROFEN 200 MG
1 TABLET ORAL
Qty: 20 | Refills: 0
Start: 2022-01-28 | End: 2022-02-01

## 2022-01-28 RX ORDER — CEPHALEXIN 500 MG
1 CAPSULE ORAL
Qty: 14 | Refills: 0
Start: 2022-01-28 | End: 2022-02-03

## 2022-01-28 RX ADMIN — Medication 600 MILLIGRAM(S): at 17:10

## 2022-01-28 RX ADMIN — Medication 500 MILLIGRAM(S): at 17:10

## 2022-01-28 NOTE — ED STATDOCS - CLINICAL SUMMARY MEDICAL DECISION MAKING FREE TEXT BOX
Will send urine with culture and upreg. Anticipate d/c home with PO abx. Will send urine with culture and upreg. Pt with benign abdomen - not suspicious for acute abdomen.  Feels similar to prior utis.  No abdominal pain presently.  Anticipate d/c home with PO abx.

## 2022-01-28 NOTE — ED STATDOCS - NSFOLLOWUPINSTRUCTIONS_ED_ALL_ED_FT
Please follow up with your Primary MD in 1-2 days. Return to ED immediately for any new or concerning symptoms or worsening symptoms    Urinary Tract Infection, Adult  ImageA urinary tract infection (UTI) is an infection of any part of the urinary tract, which includes the kidneys, ureters, bladder, and urethra. These organs make, store, and get rid of urine in the body. UTI can be a bladder infection (cystitis) or kidney infection (pyelonephritis).    What are the causes?  This infection may be caused by fungi, viruses, or bacteria. Bacteria are the most common cause of UTIs. This condition can also be caused by repeated incomplete emptying of the bladder during urination.    What increases the risk?  This condition is more likely to develop if:    You ignore your need to urinate or hold urine for long periods of time.  You do not empty your bladder completely during urination.  You wipe back to front after urinating or having a bowel movement, if you are female.  You are uncircumcised, if you are male.  You are constipated.  You have a urinary catheter that stays in place (indwelling).  You have a weak defense (immune) system.  You have a medical condition that affects your bowels, kidneys, or bladder.  You have diabetes.  You take antibiotic medicines frequently or for long periods of time, and the antibiotics no longer work well against certain types of infections (antibiotic resistance).  You take medicines that irritate your urinary tract.  You are exposed to chemicals that irritate your urinary tract.  You are female.    What are the signs or symptoms?  Symptoms of this condition include:    Fever.  Frequent urination or passing small amounts of urine frequently.  Needing to urinate urgently.  Pain or burning with urination.  Urine that smells bad or unusual.  Cloudy urine.  Pain in the lower abdomen or back.  Trouble urinating.  Blood in the urine.  Vomiting or being less hungry than normal.  Diarrhea or abdominal pain.  Vaginal discharge, if you are female.    How is this diagnosed?  This condition is diagnosed with a medical history and physical exam. You will also need to provide a urine sample to test your urine. Other tests may be done, including:    Blood tests.  Sexually transmitted disease (STD) testing.    If you have had more than one UTI, a cystoscopy or imaging studies may be done to determine the cause of the infections.    How is this treated?  Treatment for this condition often includes a combination of two or more of the following:    Antibiotic medicine.  Other medicines to treat less common causes of UTI.  Over-the-counter medicines to treat pain.  Drinking enough water to stay hydrated.    Follow these instructions at home:  Take over-the-counter and prescription medicines only as told by your health care provider.  If you were prescribed an antibiotic, take it as told by your health care provider. Do not stop taking the antibiotic even if you start to feel better.  Avoid alcohol, caffeine, tea, and carbonated beverages. They can irritate your bladder.  Drink enough fluid to keep your urine clear or pale yellow.  Keep all follow-up visits as told by your health care provider. This is important.  ImageMake sure to:    Empty your bladder often and completely. Do not hold urine for long periods of time.  Empty your bladder before and after sex.  Wipe from front to back after a bowel movement if you are female. Use each tissue one time when you wipe.    Contact a health care provider if:  You have back pain.  You have a fever.  You feel nauseous or vomit.  Your symptoms do not get better after 3 days.  Your symptoms go away and then return.  Get help right away if:  You have severe back pain or lower abdominal pain.  You are vomiting and cannot keep down any medicines or water.  This information is not intended to replace advice given to you by your health care provider. Make sure you discuss any questions you have with your health care provider.

## 2022-01-28 NOTE — ED STATDOCS - PATIENT PORTAL LINK FT
You can access the FollowMyHealth Patient Portal offered by [facility] by registering at the following website: [email or web address]. By joining [email or web address]’s FollowMyHealth portal, you will also be able to view your health information using other applications (apps) compatible with our system.

## 2022-01-28 NOTE — ED STATDOCS - PROGRESS NOTE DETAILS
47 y/o F presents with urinary sx since wednesday. pt reports intermittent pain across lower abdomen, straining to urinate and dysuria. Feels like prior UTIs. Denies fever/chills, n/v/d, CP, SOB, or other complaints at this time  Abd: soft, nd, mild suprapubic and LLQ ttp. Neg CVAT B/L. -Asher Alexander PA-C Results reviewed and discussed with pt. UA unremarkable however given sx will treat with keflex to cover for uti/early pyelo pending urine culture Discussed importance of close FU with PMD. Pt asked to return to ED immediately for any new or concerning sx or worsening. Pt acknowledges and understands plan. -Asher Alexander PA-C

## 2022-01-28 NOTE — ED STATDOCS - OBJECTIVE STATEMENT
45 y/o female with a PMHx of lupus, RA, sciatica, UTI presents to the ED c/o dysuria and abd cramping. No other complaints at this time.

## 2022-01-29 LAB
CULTURE RESULTS: SIGNIFICANT CHANGE UP
SPECIMEN SOURCE: SIGNIFICANT CHANGE UP

## 2022-01-31 ENCOUNTER — APPOINTMENT (OUTPATIENT)
Dept: OBGYN | Facility: CLINIC | Age: 47
End: 2022-01-31

## 2022-02-01 ENCOUNTER — APPOINTMENT (OUTPATIENT)
Dept: OBGYN | Facility: CLINIC | Age: 47
End: 2022-02-01

## 2022-02-01 ENCOUNTER — APPOINTMENT (OUTPATIENT)
Dept: OBGYN | Facility: CLINIC | Age: 47
End: 2022-02-01
Payer: MEDICAID

## 2022-02-01 VITALS — SYSTOLIC BLOOD PRESSURE: 102 MMHG | WEIGHT: 136 LBS | DIASTOLIC BLOOD PRESSURE: 70 MMHG | BODY MASS INDEX: 24.09 KG/M2

## 2022-02-01 VITALS
BODY MASS INDEX: 30.12 KG/M2 | SYSTOLIC BLOOD PRESSURE: 118 MMHG | WEIGHT: 170 LBS | HEIGHT: 63 IN | DIASTOLIC BLOOD PRESSURE: 84 MMHG

## 2022-02-01 DIAGNOSIS — N89.8 OTHER SPECIFIED NONINFLAMMATORY DISORDERS OF VAGINA: ICD-10-CM

## 2022-02-01 DIAGNOSIS — Z97.5 PRESENCE OF (INTRAUTERINE) CONTRACEPTIVE DEVICE: ICD-10-CM

## 2022-02-01 PROCEDURE — 99214 OFFICE O/P EST MOD 30 MIN: CPT

## 2022-02-01 NOTE — PHYSICAL EXAM
[Normal] : uterus [Discharge] : a  ~M vaginal discharge was present [No Bleeding] : there was no active vaginal bleeding [IUD String] : had an IUD string protruding out [Uterine Adnexae] : were not tender and not enlarged [FreeTextEntry4] : cultures done  thick yellowish discharge noted

## 2022-02-01 NOTE — CHIEF COMPLAINT
[Urgent Visit] : Urgent Visit [FreeTextEntry1] : Patient is a 46-year-old female presents for an urgency and a visit with complaints of pelvic cramping. Patient denies any abnormal vaginal bleeding does have a slight vaginal discharge. Patient states she has no problems urinating or moving her bowels patient does state she has back problems. Patient is not taking any medication for the cramping  patient has an IUD in place

## 2022-02-02 ENCOUNTER — APPOINTMENT (OUTPATIENT)
Dept: OBGYN | Facility: CLINIC | Age: 47
End: 2022-02-02

## 2022-02-02 LAB
APPEARANCE: CLEAR
BACTERIA: NEGATIVE
BILIRUBIN URINE: NEGATIVE
BLOOD URINE: NEGATIVE
C TRACH RRNA SPEC QL NAA+PROBE: NOT DETECTED
CANDIDA VAG CYTO: NOT DETECTED
COLOR: COLORLESS
G VAGINALIS+PREV SP MTYP VAG QL MICRO: NOT DETECTED
GLUCOSE QUALITATIVE U: NEGATIVE
HYALINE CASTS: 1 /LPF
KETONES URINE: NEGATIVE
LEUKOCYTE ESTERASE URINE: NEGATIVE
MICROSCOPIC-UA: NORMAL
N GONORRHOEA RRNA SPEC QL NAA+PROBE: NOT DETECTED
NITRITE URINE: NEGATIVE
PH URINE: 6
PROTEIN URINE: NEGATIVE
RED BLOOD CELLS URINE: 1 /HPF
SOURCE AMPLIFICATION: NORMAL
SPECIFIC GRAVITY URINE: 1.01
SQUAMOUS EPITHELIAL CELLS: 0 /HPF
T VAGINALIS VAG QL WET PREP: NOT DETECTED
UROBILINOGEN URINE: NORMAL
WHITE BLOOD CELLS URINE: 0 /HPF

## 2022-02-03 ENCOUNTER — OUTPATIENT (OUTPATIENT)
Dept: OUTPATIENT SERVICES | Facility: HOSPITAL | Age: 47
LOS: 1 days | End: 2022-02-03
Payer: MEDICAID

## 2022-02-03 ENCOUNTER — APPOINTMENT (OUTPATIENT)
Dept: ULTRASOUND IMAGING | Facility: CLINIC | Age: 47
End: 2022-02-03
Payer: MEDICAID

## 2022-02-03 ENCOUNTER — RESULT REVIEW (OUTPATIENT)
Age: 47
End: 2022-02-03

## 2022-02-03 DIAGNOSIS — N89.8 OTHER SPECIFIED NONINFLAMMATORY DISORDERS OF VAGINA: ICD-10-CM

## 2022-02-03 DIAGNOSIS — Z97.5 PRESENCE OF (INTRAUTERINE) CONTRACEPTIVE DEVICE: ICD-10-CM

## 2022-02-03 LAB — BACTERIA UR CULT: NORMAL

## 2022-02-03 PROCEDURE — 76830 TRANSVAGINAL US NON-OB: CPT | Mod: 26

## 2022-02-03 PROCEDURE — 76830 TRANSVAGINAL US NON-OB: CPT

## 2022-02-03 PROCEDURE — 76856 US EXAM PELVIC COMPLETE: CPT

## 2022-02-03 PROCEDURE — 76856 US EXAM PELVIC COMPLETE: CPT | Mod: 26

## 2022-02-07 DIAGNOSIS — N83.291 OTHER OVARIAN CYST, RIGHT SIDE: ICD-10-CM

## 2022-02-07 LAB — BACTERIA GENITAL AEROBE CULT: NORMAL

## 2022-02-21 NOTE — DISCUSSION/SUMMARY
"  History   Chief Complaint:  No chief complaint on file.    HPI   Awilda Rocha is a 15 year old female who presents with nausea and vomiting.  The patient and her mother ate at [employer] last night.  Early this morning, she woke up with nausea and vomiting.  The patient's mother has had nausea and vomiting since very late last night as well.  No diarrhea.  No fever.  No chance of pregnancy.  No dysuria or hematuria.  She has not taken anything for this.  She reports some epigastric cramping.    ROS:  Review of Systems   Constitutional: Negative for fever.   Respiratory: Negative for cough and shortness of breath.    Cardiovascular: Negative for chest pain.   Gastrointestinal: Positive for abdominal pain, nausea and vomiting. Negative for blood in stool and diarrhea.   Genitourinary: Negative for flank pain.   All other systems reviewed and are negative.    Allergies:  Sulfa Drugs     Medications:    ondansetron (ZOFRAN ODT) 4 MG ODT tab  fluticasone (FLONASE) 50 MCG/ACT nasal spray        Past Medical History:    No past medical history on file.  There is no problem list on file for this patient.       Past Surgical History:    No past surgical history on file.     Family History:    family history is not on file.    Social History:   reports that she has never smoked. She does not have any smokeless tobacco history on file. She reports that she does not drink alcohol and does not use drugs.  PCP: No Ref-Primary, Physician     Physical Exam     Patient Vitals for the past 24 hrs:   BP Temp Temp src Pulse Resp SpO2 Height Weight   02/21/22 0911 102/63 98.3  F (36.8  C) Temporal 78 16 97 % -- --   02/21/22 0859 -- -- -- -- -- -- 1.626 m (5' 4\") 62.6 kg (138 lb)        Physical Exam  Physical Exam   General:  Sitting on bed with mother at bedside.   HENT:  No obvious trauma to head  Right Ear:  External ear normal.   Left Ear:  External ear normal.   Nose:  Nose normal.   Eyes:  Conjunctivae and EOM are normal. " [Medication Risks Reviewed] : Medication risks reviewed Pupils are equal, round, and reactive.   Neck: Normal range of motion. Neck supple. No tracheal deviation present.   CV:  Normal heart sounds. No murmur heard.  Pulm/Chest: Effort normal and breath sounds normal.   Abd: Soft. No distension. There is slight epigastric tenderness. There is no rigidity, no rebound and no guarding. Negative Garcia's and negative McBurney's.  M/S: Normal range of motion.   Neuro: Alert. GCS 15.  Skin: Skin is warm and dry. No rash noted. Not diaphoretic.   Psych: Normal mood and affect. Behavior is normal.     Emergency Department Course   Laboratory:  Labs Ordered and Resulted from Time of ED Arrival to Time of ED Departure   CBC WITH PLATELETS AND DIFFERENTIAL - Abnormal       Result Value    WBC Count 3.9 (*)     RBC Count 4.36      Hemoglobin 12.3      Hematocrit 36.3      MCV 83      MCH 28.2      MCHC 33.9      RDW 13.3      Platelet Count 260      % Neutrophils 48      % Lymphocytes 41      % Monocytes 8      % Eosinophils 2      % Basophils 1      % Immature Granulocytes 0      NRBCs per 100 WBC 0      Absolute Neutrophils 1.9      Absolute Lymphocytes 1.6      Absolute Monocytes 0.3      Absolute Eosinophils 0.1      Absolute Basophils 0.0      Absolute Immature Granulocytes 0.0      Absolute NRBCs 0.0     LIPASE - Normal    Lipase 68     COMPREHENSIVE METABOLIC PANEL    Sodium 136      Potassium 3.6      Chloride 108      Carbon Dioxide (CO2) 25      Anion Gap 3      Urea Nitrogen 12      Creatinine 0.54      Calcium 8.7      Glucose 91      Alkaline Phosphatase 105      AST 15      ALT 18      Protein Total 7.3      Albumin 3.9      Bilirubin Total 1.2      GFR Estimate           Emergency Department Course:  Reviewed:  I reviewed nursing notes, vitals and past medical history    Assessments:  0915 I obtained history and examined the patient as noted above.   1005 I rechecked the patient and explained findings.     Interventions:  Medications   0.9% sodium chloride BOLUS  [de-identified] : She needs to proceed with her GI workup. She is about to schedule an endoscopy. I am uncertain why she is not yet on a proton pump inhibitor. I will see her for followup when her GI situation has been addressed. In the interim she can use Tylenol. (1,000 mLs Intravenous New Bag 2/21/22 0918)   ondansetron (ZOFRAN) injection 4 mg (4 mg Intravenous Given 2/21/22 0919)     Disposition:  The patient was discharged to home.     Impression & Plan    Covid-19  Awilda Rocha was evaluated during a global COVID-19 pandemic, which necessitated consideration that the patient might be at risk for infection with the SARS-CoV-2 virus that causes COVID-19.   Applicable protocols for evaluation were followed during the patient's care.   COVID-19 was considered as part of the patient's evaluation.    Medical Decision Making:  Awilda Rocha is a very pleasant 15 year old year old patient who presents to the emergency department with concern of epigastric abdominal pain and nausea and vomiting.  This began very early this morning.  The patient's mother has similar symptoms.  They both ate the same [employer] food last night.  The patient felt significantly better after the above interventions.  I suspect the patient's symptoms are from a foodborne illness.  No loose stools at this point and no indication for stool testing.  She has a completely benign abdominal exam and no pain to suggest pancreatitis, cholecystitis, appendicitis, etc.  She denies any chance of pregnancy.  No urinary symptoms to suggest UTI or pyelonephritis nor any flank pain to suggest kidney stone.    The treatment plan was discussed with the patient and they expressed understanding of this plan and consented to the plan.  In addition, the patient will return to the emergency department if their symptoms persist, worsen, if new symptoms arise or if there is any concern as other pathology may be present that is not evident at this time. They also understand the importance of close follow up in the clinic and if unable to do so will return to the emergency department for a reevaluation. All questions were answered.    Diagnosis:    ICD-10-CM    1. Nausea and vomiting, intractability of vomiting not  specified, unspecified vomiting type  R11.2    2. Abdominal pain, epigastric  R10.13         Discharge Medications:  New Prescriptions    ONDANSETRON (ZOFRAN ODT) 4 MG ODT TAB    Take 1 tablet (4 mg) by mouth every 8 hours as needed for nausea        2/21/2022   Stefano Bolden, Stefano Starkey DO  02/21/22 1016

## 2022-03-21 ENCOUNTER — OUTPATIENT (OUTPATIENT)
Dept: OUTPATIENT SERVICES | Facility: HOSPITAL | Age: 47
LOS: 1 days | End: 2022-03-21
Payer: MEDICAID

## 2022-03-21 ENCOUNTER — RESULT REVIEW (OUTPATIENT)
Age: 47
End: 2022-03-21

## 2022-03-21 ENCOUNTER — APPOINTMENT (OUTPATIENT)
Dept: ULTRASOUND IMAGING | Facility: CLINIC | Age: 47
End: 2022-03-21
Payer: MEDICAID

## 2022-03-21 DIAGNOSIS — N83.291 OTHER OVARIAN CYST, RIGHT SIDE: ICD-10-CM

## 2022-03-21 PROCEDURE — 76830 TRANSVAGINAL US NON-OB: CPT | Mod: 26

## 2022-03-21 PROCEDURE — 76856 US EXAM PELVIC COMPLETE: CPT

## 2022-03-21 PROCEDURE — 76856 US EXAM PELVIC COMPLETE: CPT | Mod: 26

## 2022-03-21 PROCEDURE — 76830 TRANSVAGINAL US NON-OB: CPT

## 2022-03-22 ENCOUNTER — NON-APPOINTMENT (OUTPATIENT)
Age: 47
End: 2022-03-22

## 2022-03-22 DIAGNOSIS — N83.202 UNSPECIFIED OVARIAN CYST, LEFT SIDE: ICD-10-CM

## 2022-03-29 ENCOUNTER — APPOINTMENT (OUTPATIENT)
Dept: DERMATOLOGY | Facility: CLINIC | Age: 47
End: 2022-03-29
Payer: MEDICAID

## 2022-03-29 PROCEDURE — 99214 OFFICE O/P EST MOD 30 MIN: CPT

## 2022-03-29 NOTE — ASSESSMENT
[FreeTextEntry1] : acne;  some outbreaks; stable on 2 topicals;  also gets facials \par f/u for TCA q 2-3 months;\par used glyMask, po txs already without good effect;\par \par also with rosacea;  flared recently; having eye involvement;  need to discuss with ophtho; \par discussed po tx if continues to have problems with rosacea;  pt. states allergic to doxy, would use MCN\par \par \par Therapeutic options and their risks and benefits; along with multiple diagnostic possibilities were discussed at length; risks and benefits of further study were discussed;\par \par Elta Tinted qd for sun protection; \par \par also:  discussed btx for glabella;  deep creases;  RYP/TYP with filler; \par The risks and benefits of the procedure, were explained at length \par \par also discussed IPL for lentigines R periorbital; \par \par \par

## 2022-03-29 NOTE — PHYSICAL EXAM
[FreeTextEntry3] : Skin examination performed of the face, neck, chest, hands, lower legs;\par The patient is well, alert and oriented, pleasant and cooperative.\par Eyelids, conjunctivae, oral mucosa, digits and nails all normal.  \par No cervical adenopathy.\par \par \par decreased acneiform papules on jawline and cheeks- \par + erythema;  b/l cheeks

## 2022-04-11 PROBLEM — Z11.59 SCREENING FOR VIRAL DISEASE: Status: ACTIVE | Noted: 2020-05-26

## 2022-05-05 NOTE — ED STATDOCS - CONDITION AT DISCHARGE:
Suturegard Intro: Intraoperative tissue expansion was performed, utilizing the SUTUREGARD device, in order to reduce wound tension. Satisfactory

## 2022-05-16 ENCOUNTER — APPOINTMENT (OUTPATIENT)
Dept: ULTRASOUND IMAGING | Facility: CLINIC | Age: 47
End: 2022-05-16
Payer: MEDICAID

## 2022-05-16 ENCOUNTER — OUTPATIENT (OUTPATIENT)
Dept: OUTPATIENT SERVICES | Facility: HOSPITAL | Age: 47
LOS: 1 days | End: 2022-05-16
Payer: MEDICAID

## 2022-05-16 DIAGNOSIS — N83.202 UNSPECIFIED OVARIAN CYST, LEFT SIDE: ICD-10-CM

## 2022-05-16 PROCEDURE — 76830 TRANSVAGINAL US NON-OB: CPT

## 2022-05-16 PROCEDURE — 76830 TRANSVAGINAL US NON-OB: CPT | Mod: 26

## 2022-05-19 ENCOUNTER — NON-APPOINTMENT (OUTPATIENT)
Age: 47
End: 2022-05-19

## 2022-06-02 ENCOUNTER — NON-APPOINTMENT (OUTPATIENT)
Age: 47
End: 2022-06-02

## 2022-06-27 ENCOUNTER — APPOINTMENT (OUTPATIENT)
Dept: OBGYN | Facility: CLINIC | Age: 47
End: 2022-06-27

## 2022-06-27 VITALS
BODY MASS INDEX: 23.39 KG/M2 | HEIGHT: 63 IN | SYSTOLIC BLOOD PRESSURE: 114 MMHG | WEIGHT: 132 LBS | DIASTOLIC BLOOD PRESSURE: 62 MMHG

## 2022-06-27 DIAGNOSIS — Z01.419 ENCOUNTER FOR GYNECOLOGICAL EXAMINATION (GENERAL) (ROUTINE) W/OUT ABNORMAL FINDINGS: ICD-10-CM

## 2022-06-27 DIAGNOSIS — Z97.5 PRESENCE OF (INTRAUTERINE) CONTRACEPTIVE DEVICE: ICD-10-CM

## 2022-06-27 DIAGNOSIS — R10.2 PELVIC AND PERINEAL PAIN: ICD-10-CM

## 2022-06-27 DIAGNOSIS — N83.292 OTHER OVARIAN CYST, LEFT SIDE: ICD-10-CM

## 2022-06-27 PROCEDURE — 99396 PREV VISIT EST AGE 40-64: CPT

## 2022-06-27 PROCEDURE — 82270 OCCULT BLOOD FECES: CPT

## 2022-06-27 NOTE — PHYSICAL EXAM
[Appropriately responsive] : appropriately responsive [Alert] : alert [No Acute Distress] : no acute distress [No Lymphadenopathy] : no lymphadenopathy [Regular Rate Rhythm] : regular rate rhythm [No Murmurs] : no murmurs [Clear to Auscultation B/L] : clear to auscultation bilaterally [Soft] : soft [Non-tender] : non-tender [Non-distended] : non-distended [No HSM] : No HSM [No Lesions] : no lesions [No Mass] : no mass [Oriented x3] : oriented x3 [Examination Of The Breasts] : a normal appearance [No Masses] : no breast masses were palpable [Labia Majora] : normal [Labia Minora] : normal [IUD String] : an IUD string was noted [Normal] : normal [Uterine Adnexae] : normal [No Tenderness] : no tenderness [Nl Sphincter Tone] : normal sphincter tone [FreeTextEntry5] : Pap done [FreeTextEntry9] : Hemoccult negative

## 2022-06-27 NOTE — HISTORY OF PRESENT ILLNESS
[FreeTextEntry1] : Patient is a 46-year-old female who presents for a routine annual gynecologic examination.  Patient states that she recently had some pelvic discomfort and 10 days of staining with her period which is longer than usual.  Patient has a Mirena IUD in place.  Patient had a sonogram in May which revealed a 3 cm simple ovarian cyst.  Discussed this issue with patient advised patient to have a follow-up pelvic sonogram in 2 weeks time.  Further treatment pending test results

## 2022-06-28 LAB — HPV HIGH+LOW RISK DNA PNL CVX: NOT DETECTED

## 2022-06-29 LAB
C TRACH RRNA SPEC QL NAA+PROBE: NOT DETECTED
CYTOLOGY CVX/VAG DOC THIN PREP: NORMAL
N GONORRHOEA RRNA SPEC QL NAA+PROBE: NOT DETECTED
SOURCE TP AMPLIFICATION: NORMAL

## 2022-08-29 ENCOUNTER — APPOINTMENT (OUTPATIENT)
Dept: DERMATOLOGY | Facility: CLINIC | Age: 47
End: 2022-08-29

## 2022-08-29 PROCEDURE — 99214 OFFICE O/P EST MOD 30 MIN: CPT

## 2022-08-29 NOTE — ASSESSMENT
[FreeTextEntry1] : acne;  some outbreaks; stable on 2 topicals;  also gets facials \par f/u for TCA q 2-3 months;\par used glyMask, po txs already without good effect;\par \par Therapeutic options and their risks and benefits; along with multiple diagnostic possibilities were discussed at length; risks and benefits of further study were discussed;\par \par \par also discussed IPL for lentigines B/L periorbital; tx full face CYP\par Risks and benefits of Intense Pulsed Light Therapy were discussed including swelling, discoloration, lack of response, and need for multiple treatments;\par

## 2022-08-29 NOTE — PHYSICAL EXAM
[FreeTextEntry3] : Skin examination performed of the face, neck, chest, hands, lower legs;\par The patient is well, alert and oriented, pleasant and cooperative.\par Eyelids, conjunctivae, oral mucosa, digits and nails all normal.  \par No cervical adenopathy.\par \par \par decreased acneiform papules on jawline and cheeks- \par dark lentigines;  b/l cheeks

## 2022-08-29 NOTE — HISTORY OF PRESENT ILLNESS
[de-identified] : f/u for acne;  using topicals\par has good effect from TCA txs,\par did not do well with po txs; \par currently using clindamycin, tretinoin\par

## 2022-09-09 ENCOUNTER — RX RENEWAL (OUTPATIENT)
Age: 47
End: 2022-09-09

## 2022-10-01 ENCOUNTER — APPOINTMENT (OUTPATIENT)
Dept: ORTHOPEDIC SURGERY | Facility: CLINIC | Age: 47
End: 2022-10-01

## 2022-10-01 VITALS
SYSTOLIC BLOOD PRESSURE: 120 MMHG | HEART RATE: 72 BPM | TEMPERATURE: 97.1 F | WEIGHT: 126 LBS | HEIGHT: 63 IN | BODY MASS INDEX: 22.32 KG/M2 | DIASTOLIC BLOOD PRESSURE: 72 MMHG

## 2022-10-01 DIAGNOSIS — M79.644 PAIN IN RIGHT FINGER(S): ICD-10-CM

## 2022-10-01 DIAGNOSIS — M79.641 PAIN IN RIGHT HAND: ICD-10-CM

## 2022-10-01 PROCEDURE — 99214 OFFICE O/P EST MOD 30 MIN: CPT

## 2022-10-01 NOTE — PHYSICAL EXAM
[de-identified] : Right hand Physical Examination:\par \par The patient is AAOx3.  \par Neurovascularly Intact: Yes\par Erythema, Warmth, Rubor: Negative\par Swelling: Negative\par \par *Wrist ROM normal in all planes. \par \par ROM Fingers:\par MCPJ: 0-90\par PIPJ: 0-120\par DIPJ: 0-60\par Thumb IPJ: 0-60.  The patient has normal range of motion at the IPJ but she does have pain and tenderness of the IPJ mostly on the ulnar side with a possible small nodule in that area that is tender coming from the joint.  The joint is stable.\par *Patient can make a normal fist with all fingers pointing normally towards the scaphoid. \par \par Thumb ROM:\par MCP Joint: Extension 5 degrees/Flexion 60 degrees\par IP Joint: 10 degrees Hyperextension/Flexion 80 degrees.  The patient has normal range of motion at the IPJ but she does have pain and tenderness of the IPJ mostly on the ulnar side with a possible small nodule in that area that is tender coming from the joint.  The joint is stable.\par *Normal thumb adduction into the palm of the hand. \par Grind Test (CMC Joint OA): Negative\par \par Osteoarthritis: \par Heberden's Node's (DIP): Possible nodule at the IPJ of the thumb.\par Lidia's Node's (PIP): Negative\par [place] Neck Deformity (RA): Negative\par Ulnar Deviation at MCPJ (RA): Negative\par \par Deformities/Abnormalities:\par Mallet Finger: Negative\par Dupuytren's Contractures: Negative\par Trigger Finger: Negative\par \par  [de-identified] : Office Location: [address] [place], [facility]\par Office Phone: [phone]\par Office Fax: [phone]\par PATIENT NAME: Cynthia Poe\par PATIENT PHONE NUMBER:\par PATIENT ID: [id number]\par : 1975\par DATE OF EXAM: 2022\par R. Phys. Name: Mike Villa\par R. Phys. Address: [facility] [address]\par R. Phys. Phone: [phone]\par RIGHT HAND XRAY COMPLETE 3 OR MORE VIEWS\par \par HISTORY:\par \par AP oblique and lateral views of the right hand are obtained.\par \par The bony texture appears unremarkable. There is no fracture or dislocation\par present. The joint spaces appear intact. No significant soft tissue\par abnormalities are demonstrated.\par \par IMPRESSION:\par There are no significant degenerative changes.\par \par There are no fractures, subluxations, dislocations nor foreign bodies.\par \par Signed by: Becky Gama MD\par Signed Date: 2022 8:29 AM EDT\par \par SIGNED BY: Becky Gama M.D., Ext. [phone] 08:29 AM

## 2022-10-01 NOTE — PHYSICAL EXAM
[de-identified] : Right hand Physical Examination:\par \par The patient is AAOx3.  \par Neurovascularly Intact: Yes\par Erythema, Warmth, Rubor: Negative\par Swelling: Negative\par \par *Wrist ROM normal in all planes. \par \par ROM Fingers:\par MCPJ: 0-90\par PIPJ: 0-120\par DIPJ: 0-60\par Thumb IPJ: 0-60.  The patient has normal range of motion at the IPJ but she does have pain and tenderness of the IPJ mostly on the ulnar side with a possible small nodule in that area that is tender coming from the joint.  The joint is stable.\par *Patient can make a normal fist with all fingers pointing normally towards the scaphoid. \par \par Thumb ROM:\par MCP Joint: Extension 5 degrees/Flexion 60 degrees\par IP Joint: 10 degrees Hyperextension/Flexion 80 degrees.  The patient has normal range of motion at the IPJ but she does have pain and tenderness of the IPJ mostly on the ulnar side with a possible small nodule in that area that is tender coming from the joint.  The joint is stable.\par *Normal thumb adduction into the palm of the hand. \par Grind Test (CMC Joint OA): Negative\par \par Osteoarthritis: \par Heberden's Node's (DIP): Possible nodule at the IPJ of the thumb.\par Lidia's Node's (PIP): Negative\par [place] Neck Deformity (RA): Negative\par Ulnar Deviation at MCPJ (RA): Negative\par \par Deformities/Abnormalities:\par Mallet Finger: Negative\par Dupuytren's Contractures: Negative\par Trigger Finger: Negative\par \par  [de-identified] : Office Location: [address] [facility]\par Office Phone: [phone]\par Office Fax: [phone]\par PATIENT NAME: Cynthia Poe\par PATIENT PHONE NUMBER:\par PATIENT ID: [id number]\par : 1975\par DATE OF EXAM: 2022\par R. Phys. Name: Mike Villa\par R. Phys. Address: On license of [facility] [address]\par R. Phys. Phone: [phone]\par RIGHT HAND XRAY COMPLETE 3 OR MORE VIEWS\par \par HISTORY:\par \par AP oblique and lateral views of the right hand are obtained.\par \par The bony texture appears unremarkable. There is no fracture or dislocation\par present. The joint spaces appear intact. No significant soft tissue\par abnormalities are demonstrated.\par \par IMPRESSION:\par There are no significant degenerative changes.\par \par There are no fractures, subluxations, dislocations nor foreign bodies.\par \par Signed by: Becky Gama MD\par Signed Date: 2022 8:29 AM EDT\par \par SIGNED BY: Becky Gama M.D., Ext. [phone] 08:29 AM

## 2022-10-01 NOTE — HISTORY OF PRESENT ILLNESS
[FreeTextEntry1] : the patient is a 45 yo female who presents with right thumb and hand pain since June 22.  No trauma to the thumb or hand.  Patient has lupus and RA, currently on Plaquenil.  Patient states that she experiences intermittent pain that radiate to the top of her hand and forearm.  Patient having most of the pain in the the thumb at the IP joint.  Patient does state that she does wake up at times in middle of the night with some numbness and tingling in the hand but it is not all the time and states that it is positional with the way she falls asleep.  Patient has no other complaints.\par \par Patient does see a rheumatologist.

## 2022-10-01 NOTE — DISCUSSION/SUMMARY
[FreeTextEntry1] : At this time I recommend an MRI of the right hand to evaluate specifically the thumb at the IP joint to make sure there are no inflammatory changes in that joint secondary to her inflammatory arthritis.  I gave her a prescription to get a thumb spica splint to use for pain relief.  The patient will continue with anti-inflammatory gel.  I offered her a prescription strength anti-inflammatory but patient denied.  Once the MRI is completed I will review with the patient and I will most likely have her follow-up with our hand and wrist specialist.  All of her questions were answered and she understood the treatment course.

## 2022-10-01 NOTE — HISTORY OF PRESENT ILLNESS
[FreeTextEntry1] : the patient is a 47 yo female who presents with right thumb and hand pain since June 22.  No trauma to the thumb or hand.  Patient has lupus and RA, currently on Plaquenil.  Patient states that she experiences intermittent pain that radiate to the top of her hand and forearm.  Patient having most of the pain in the the thumb at the IP joint.  Patient does state that she does wake up at times in middle of the night with some numbness and tingling in the hand but it is not all the time and states that it is positional with the way she falls asleep.  Patient has no other complaints.\par \par Patient does see a rheumatologist.

## 2022-11-18 ENCOUNTER — RX RENEWAL (OUTPATIENT)
Age: 47
End: 2022-11-18

## 2022-11-22 ENCOUNTER — APPOINTMENT (OUTPATIENT)
Dept: INTERNAL MEDICINE | Facility: CLINIC | Age: 47
End: 2022-11-22

## 2022-11-22 VITALS
HEIGHT: 63 IN | TEMPERATURE: 98 F | BODY MASS INDEX: 23.04 KG/M2 | WEIGHT: 130 LBS | DIASTOLIC BLOOD PRESSURE: 76 MMHG | SYSTOLIC BLOOD PRESSURE: 116 MMHG | HEART RATE: 61 BPM | OXYGEN SATURATION: 98 % | RESPIRATION RATE: 14 BRPM

## 2022-11-22 DIAGNOSIS — K21.9 GASTRO-ESOPHAGEAL REFLUX DISEASE W/OUT ESOPHAGITIS: Chronic | ICD-10-CM

## 2022-11-22 PROCEDURE — 99396 PREV VISIT EST AGE 40-64: CPT

## 2022-11-22 NOTE — ASSESSMENT
[FreeTextEntry1] : Reflux: Controlled. \par Rheum: On plaquenil. Follows with rheum and ophtho. \par HCM: Up to date on mammogram, pap smear, colonoscopy. Check labs. Will discuss results. \par

## 2022-11-22 NOTE — REVIEW OF SYSTEMS
[Fever] : no fever [Chills] : no chills [Night Sweats] : no night sweats [Discharge] : no discharge [Vision Problems] : no vision problems [Itching] : no itching [Earache] : no earache [Nasal Discharge] : no nasal discharge [Sore Throat] : no sore throat [Chest Pain] : no chest pain [Palpitations] : no palpitations [Lower Ext Edema] : no lower extremity edema [Shortness Of Breath] : no shortness of breath [Wheezing] : no wheezing [Cough] : no cough [Dyspnea on Exertion] : no dyspnea on exertion [Abdominal Pain] : no abdominal pain [Nausea] : no nausea [Constipation] : no constipation [Diarrhea] : diarrhea [Vomiting] : no vomiting [Melena] : no melena [Dysuria] : no dysuria [Muscle Weakness] : no muscle weakness [Muscle Pain] : no muscle pain [Itching] : no itching [Skin Rash] : no skin rash [Headache] : no headache [Dizziness] : no dizziness [Suicidal] : not suicidal [Anxiety] : no anxiety [Depression] : no depression [Easy Bleeding] : no easy bleeding [Easy Bruising] : no easy bruising [Swollen Glands] : no swollen glands

## 2022-11-22 NOTE — HEALTH RISK ASSESSMENT
[Patient reported colonoscopy was normal] : Patient reported colonoscopy was normal [Good] : ~his/her~  mood as  good [0] : 2) Feeling down, depressed, or hopeless: Not at all (0) [PHQ-2 Negative - No further assessment needed] : PHQ-2 Negative - No further assessment needed [Fully functional (bathing, dressing, toileting, transferring, walking, feeding)] : Fully functional (bathing, dressing, toileting, transferring, walking, feeding) [Fully functional (using the telephone, shopping, preparing meals, housekeeping, doing laundry, using] : Fully functional and needs no help or supervision to perform IADLs (using the telephone, shopping, preparing meals, housekeeping, doing laundry, using transportation, managing medications and managing finances) [[id number]] : 0 [Change in mental status noted] : No change in mental status noted [Reports changes in hearing] : Reports no changes in hearing [Reports changes in vision] : Reports no changes in vision [ColonoscopyDate] : 05/22

## 2022-11-23 LAB
ALBUMIN SERPL ELPH-MCNC: 4.6 G/DL
ALP BLD-CCNC: 74 U/L
ALT SERPL-CCNC: 14 U/L
ANION GAP SERPL CALC-SCNC: 10 MMOL/L
AST SERPL-CCNC: 13 U/L
BASOPHILS # BLD AUTO: 0.05 K/UL
BASOPHILS NFR BLD AUTO: 0.9 %
BILIRUB SERPL-MCNC: 0.3 MG/DL
BUN SERPL-MCNC: 15 MG/DL
CALCIUM SERPL-MCNC: 9.6 MG/DL
CHLORIDE SERPL-SCNC: 103 MMOL/L
CHOLEST SERPL-MCNC: 162 MG/DL
CO2 SERPL-SCNC: 26 MMOL/L
CREAT SERPL-MCNC: 0.59 MG/DL
EGFR: 113 ML/MIN/1.73M2
EOSINOPHIL # BLD AUTO: 0.1 K/UL
EOSINOPHIL NFR BLD AUTO: 1.9 %
ESTIMATED AVERAGE GLUCOSE: 103 MG/DL
FOLATE SERPL-MCNC: 11.4 NG/ML
GLUCOSE SERPL-MCNC: 86 MG/DL
HBA1C MFR BLD HPLC: 5.2 %
HCT VFR BLD CALC: 39.9 %
HDLC SERPL-MCNC: 59 MG/DL
HGB BLD-MCNC: 13.4 G/DL
IMM GRANULOCYTES NFR BLD AUTO: 0.2 %
LDLC SERPL CALC-MCNC: 90 MG/DL
LYMPHOCYTES # BLD AUTO: 1.34 K/UL
LYMPHOCYTES NFR BLD AUTO: 25.3 %
MAN DIFF?: NORMAL
MCHC RBC-ENTMCNC: 31.8 PG
MCHC RBC-ENTMCNC: 33.6 GM/DL
MCV RBC AUTO: 94.8 FL
MONOCYTES # BLD AUTO: 0.31 K/UL
MONOCYTES NFR BLD AUTO: 5.9 %
NEUTROPHILS # BLD AUTO: 3.48 K/UL
NEUTROPHILS NFR BLD AUTO: 65.8 %
NONHDLC SERPL-MCNC: 103 MG/DL
PLATELET # BLD AUTO: 212 K/UL
POTASSIUM SERPL-SCNC: 4.2 MMOL/L
PROT SERPL-MCNC: 7.4 G/DL
RBC # BLD: 4.21 M/UL
RBC # FLD: 12.3 %
SODIUM SERPL-SCNC: 139 MMOL/L
T3 SERPL-MCNC: 106 NG/DL
T4 FREE SERPL-MCNC: 1.1 NG/DL
TRIGL SERPL-MCNC: 66 MG/DL
TSH SERPL-ACNC: 0.49 UIU/ML
VIT B12 SERPL-MCNC: 435 PG/ML
WBC # FLD AUTO: 5.29 K/UL

## 2023-01-19 ENCOUNTER — OUTPATIENT (OUTPATIENT)
Dept: OUTPATIENT SERVICES | Facility: HOSPITAL | Age: 48
LOS: 1 days | End: 2023-01-19
Payer: MEDICAID

## 2023-01-19 ENCOUNTER — RESULT REVIEW (OUTPATIENT)
Age: 48
End: 2023-01-19

## 2023-01-19 ENCOUNTER — APPOINTMENT (OUTPATIENT)
Dept: ULTRASOUND IMAGING | Facility: CLINIC | Age: 48
End: 2023-01-19
Payer: MEDICAID

## 2023-01-19 ENCOUNTER — APPOINTMENT (OUTPATIENT)
Dept: MAMMOGRAPHY | Facility: CLINIC | Age: 48
End: 2023-01-19
Payer: MEDICAID

## 2023-01-19 DIAGNOSIS — Z00.8 ENCOUNTER FOR OTHER GENERAL EXAMINATION: ICD-10-CM

## 2023-01-19 DIAGNOSIS — Z12.39 ENCOUNTER FOR OTHER SCREENING FOR MALIGNANT NEOPLASM OF BREAST: ICD-10-CM

## 2023-01-19 DIAGNOSIS — R92.2 INCONCLUSIVE MAMMOGRAM: ICD-10-CM

## 2023-01-19 DIAGNOSIS — N83.291 OTHER OVARIAN CYST, RIGHT SIDE: ICD-10-CM

## 2023-01-19 PROCEDURE — 77063 BREAST TOMOSYNTHESIS BI: CPT | Mod: 26

## 2023-01-19 PROCEDURE — 77067 SCR MAMMO BI INCL CAD: CPT | Mod: 26

## 2023-01-19 PROCEDURE — 76641 ULTRASOUND BREAST COMPLETE: CPT | Mod: 26,50

## 2023-01-19 PROCEDURE — 76830 TRANSVAGINAL US NON-OB: CPT | Mod: 26

## 2023-01-19 PROCEDURE — 77067 SCR MAMMO BI INCL CAD: CPT

## 2023-01-19 PROCEDURE — 76830 TRANSVAGINAL US NON-OB: CPT

## 2023-01-19 PROCEDURE — 76856 US EXAM PELVIC COMPLETE: CPT | Mod: 26

## 2023-01-19 PROCEDURE — 76641 ULTRASOUND BREAST COMPLETE: CPT

## 2023-01-19 PROCEDURE — 77063 BREAST TOMOSYNTHESIS BI: CPT

## 2023-01-19 PROCEDURE — 76856 US EXAM PELVIC COMPLETE: CPT

## 2023-01-20 ENCOUNTER — NON-APPOINTMENT (OUTPATIENT)
Age: 48
End: 2023-01-20

## 2023-01-20 DIAGNOSIS — Z91.89 OTHER SPECIFIED PERSONAL RISK FACTORS, NOT ELSEWHERE CLASSIFIED: ICD-10-CM

## 2023-01-24 ENCOUNTER — NON-APPOINTMENT (OUTPATIENT)
Age: 48
End: 2023-01-24

## 2023-02-13 ENCOUNTER — APPOINTMENT (OUTPATIENT)
Dept: DERMATOLOGY | Facility: CLINIC | Age: 48
End: 2023-02-13

## 2023-04-06 ENCOUNTER — APPOINTMENT (OUTPATIENT)
Dept: INTERNAL MEDICINE | Facility: CLINIC | Age: 48
End: 2023-04-06
Payer: MEDICAID

## 2023-04-06 VITALS
BODY MASS INDEX: 23.39 KG/M2 | OXYGEN SATURATION: 99 % | RESPIRATION RATE: 14 BRPM | WEIGHT: 132 LBS | TEMPERATURE: 97.9 F | DIASTOLIC BLOOD PRESSURE: 70 MMHG | SYSTOLIC BLOOD PRESSURE: 120 MMHG | HEIGHT: 63 IN | HEART RATE: 69 BPM

## 2023-04-06 DIAGNOSIS — R22.9 LOCALIZED SWELLING, MASS AND LUMP, UNSPECIFIED: ICD-10-CM

## 2023-04-06 PROCEDURE — 99213 OFFICE O/P EST LOW 20 MIN: CPT

## 2023-04-06 NOTE — REVIEW OF SYSTEMS
[Fever] : no fever [Chills] : no chills [Night Sweats] : no night sweats [Muscle Pain] : muscle pain [Itching] : Itching [Skin Rash] : no skin rash

## 2023-04-06 NOTE — PHYSICAL EXAM
[No Acute Distress] : no acute distress [No Rash] : no rash [de-identified] : firm nodularity (ill defined) left posterior thigh and right calf.

## 2023-04-06 NOTE — HEALTH RISK ASSESSMENT
[0] : 2) Feeling down, depressed, or hopeless: Not at all (0) [PHQ-2 Negative - No further assessment needed] : PHQ-2 Negative - No further assessment needed [[id number]] : 0 [Never] : Never

## 2023-04-06 NOTE — HISTORY OF PRESENT ILLNESS
[FreeTextEntry8] : 47F presents for nodules on left posterior thigh and right calf. She has been gaining weight and noticed the nodules over the past 1-2 weeks. Notes occasional pruritus but denies rash, discharge. Notes some pain with nodule at right calf. \par

## 2023-04-18 ENCOUNTER — APPOINTMENT (OUTPATIENT)
Dept: ULTRASOUND IMAGING | Facility: CLINIC | Age: 48
End: 2023-04-18
Payer: MEDICAID

## 2023-04-18 ENCOUNTER — OUTPATIENT (OUTPATIENT)
Dept: OUTPATIENT SERVICES | Facility: HOSPITAL | Age: 48
LOS: 1 days | End: 2023-04-18
Payer: MEDICAID

## 2023-04-18 DIAGNOSIS — R22.9 LOCALIZED SWELLING, MASS AND LUMP, UNSPECIFIED: ICD-10-CM

## 2023-04-18 PROCEDURE — 76882 US LMTD JT/FCL EVL NVASC XTR: CPT | Mod: 26,RT

## 2023-04-18 PROCEDURE — 76882 US LMTD JT/FCL EVL NVASC XTR: CPT

## 2023-04-18 PROCEDURE — 76882 US LMTD JT/FCL EVL NVASC XTR: CPT | Mod: 26,LT

## 2023-05-23 ENCOUNTER — APPOINTMENT (OUTPATIENT)
Dept: DERMATOLOGY | Facility: CLINIC | Age: 48
End: 2023-05-23
Payer: MEDICAID

## 2023-05-23 DIAGNOSIS — L02.91 CUTANEOUS ABSCESS, UNSPECIFIED: ICD-10-CM

## 2023-05-23 DIAGNOSIS — D23.9 OTHER BENIGN NEOPLASM OF SKIN, UNSPECIFIED: ICD-10-CM

## 2023-05-23 DIAGNOSIS — L70.0 ACNE VULGARIS: ICD-10-CM

## 2023-05-23 PROCEDURE — 10060 I&D ABSCESS SIMPLE/SINGLE: CPT

## 2023-05-23 PROCEDURE — 99214 OFFICE O/P EST MOD 30 MIN: CPT | Mod: 25

## 2023-05-23 RX ORDER — IBUPROFEN 800 MG/1
800 TABLET, FILM COATED ORAL
Qty: 21 | Refills: 0 | Status: ACTIVE | COMMUNITY
Start: 2023-02-16

## 2023-05-23 RX ORDER — COVID-19 MOLECULAR TEST ASSAY
KIT MISCELLANEOUS
Qty: 2 | Refills: 0 | Status: COMPLETED | COMMUNITY
Start: 2022-12-30

## 2023-05-23 RX ORDER — CHLORHEXIDINE GLUCONATE, 0.12% ORAL RINSE 1.2 MG/ML
0.12 SOLUTION DENTAL
Qty: 473 | Refills: 0 | Status: ACTIVE | COMMUNITY
Start: 2023-02-16

## 2023-05-23 RX ORDER — AMOXICILLIN 500 MG/1
500 CAPSULE ORAL
Qty: 21 | Refills: 0 | Status: COMPLETED | COMMUNITY
Start: 2023-02-16

## 2023-05-23 RX ORDER — PANTOPRAZOLE 40 MG/1
40 TABLET, DELAYED RELEASE ORAL
Qty: 90 | Refills: 0 | Status: ACTIVE | COMMUNITY
Start: 2023-04-17

## 2023-05-23 NOTE — HISTORY OF PRESENT ILLNESS
[FreeTextEntry1] : Lesion of the left lateral canthal region. [de-identified] : Cystic lesion, very tender.  No self tx.  Present for days, almost a week, progressive.

## 2023-05-23 NOTE — PHYSICAL EXAM
[Alert] : alert [Oriented x 3] : ~L oriented x 3 [Well Nourished] : well nourished [FreeTextEntry3] : Cystic erythematous 6 mm papule of the left lateral canthal region.\par \par Erythematous papules, few, on the face.  Superficial ice-pick scarring of the cheeks.\par \par Lentigines of the face.\par \par Cystic nodule, with central dilated pore, of the right upper back.\par \par Hyperpigmented erythematous barely elevated papule with positive dimple sign, c/w a dermatofibroma on the left low back.

## 2023-05-23 NOTE — ASSESSMENT
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

## 2023-05-30 NOTE — ED ADULT NURSE NOTE - CCCP TRG CHIEF CMPLNT
urinary symptoms Oral Minoxidil Pregnancy And Lactation Text: This medication should only be used when clearly needed if you are pregnant, attempting to become pregnant or breast feeding.

## 2023-06-01 ENCOUNTER — RX RENEWAL (OUTPATIENT)
Age: 48
End: 2023-06-01

## 2023-06-01 RX ORDER — TRETINOIN 0.5 MG/G
0.05 CREAM TOPICAL
Qty: 20 | Refills: 5 | Status: ACTIVE | COMMUNITY
Start: 2017-10-20 | End: 1900-01-01

## 2023-07-03 ENCOUNTER — APPOINTMENT (OUTPATIENT)
Dept: MRI IMAGING | Facility: CLINIC | Age: 48
End: 2023-07-03
Payer: MEDICAID

## 2023-07-03 ENCOUNTER — OUTPATIENT (OUTPATIENT)
Dept: OUTPATIENT SERVICES | Facility: HOSPITAL | Age: 48
LOS: 1 days | End: 2023-07-03
Payer: MEDICAID

## 2023-07-03 DIAGNOSIS — Z91.89 OTHER SPECIFIED PERSONAL RISK FACTORS, NOT ELSEWHERE CLASSIFIED: ICD-10-CM

## 2023-07-03 PROCEDURE — 77049 MRI BREAST C-+ W/CAD BI: CPT | Mod: 26

## 2023-07-03 PROCEDURE — A9585: CPT

## 2023-07-03 PROCEDURE — C8908: CPT

## 2023-07-03 PROCEDURE — C8937: CPT

## 2023-09-21 ENCOUNTER — EMERGENCY (EMERGENCY)
Facility: HOSPITAL | Age: 48
LOS: 0 days | Discharge: HOME | End: 2023-09-21
Attending: EMERGENCY MEDICINE
Payer: MEDICAID

## 2023-09-21 VITALS — HEIGHT: 63 IN | WEIGHT: 130.07 LBS

## 2023-09-21 VITALS
TEMPERATURE: 98 F | DIASTOLIC BLOOD PRESSURE: 51 MMHG | HEART RATE: 64 BPM | SYSTOLIC BLOOD PRESSURE: 107 MMHG | RESPIRATION RATE: 18 BRPM | OXYGEN SATURATION: 100 %

## 2023-09-21 DIAGNOSIS — R51.9 HEADACHE, UNSPECIFIED: ICD-10-CM

## 2023-09-21 DIAGNOSIS — M06.9 RHEUMATOID ARTHRITIS, UNSPECIFIED: ICD-10-CM

## 2023-09-21 DIAGNOSIS — Z88.1 ALLERGY STATUS TO OTHER ANTIBIOTIC AGENTS STATUS: ICD-10-CM

## 2023-09-21 DIAGNOSIS — R09.81 NASAL CONGESTION: ICD-10-CM

## 2023-09-21 DIAGNOSIS — M32.9 SYSTEMIC LUPUS ERYTHEMATOSUS, UNSPECIFIED: ICD-10-CM

## 2023-09-21 DIAGNOSIS — Z88.8 ALLERGY STATUS TO OTHER DRUGS, MEDICAMENTS AND BIOLOGICAL SUBSTANCES: ICD-10-CM

## 2023-09-21 PROCEDURE — 99284 EMERGENCY DEPT VISIT MOD MDM: CPT | Mod: 25

## 2023-09-21 PROCEDURE — 99284 EMERGENCY DEPT VISIT MOD MDM: CPT

## 2023-09-21 PROCEDURE — 96375 TX/PRO/DX INJ NEW DRUG ADDON: CPT

## 2023-09-21 PROCEDURE — 96374 THER/PROPH/DIAG INJ IV PUSH: CPT

## 2023-09-21 RX ORDER — METOCLOPRAMIDE HCL 10 MG
10 TABLET ORAL ONCE
Refills: 0 | Status: COMPLETED | OUTPATIENT
Start: 2023-09-21 | End: 2023-09-21

## 2023-09-21 RX ORDER — DEXAMETHASONE 0.5 MG/5ML
6 ELIXIR ORAL ONCE
Refills: 0 | Status: COMPLETED | OUTPATIENT
Start: 2023-09-21 | End: 2023-09-21

## 2023-09-21 RX ORDER — KETOROLAC TROMETHAMINE 30 MG/ML
30 SYRINGE (ML) INJECTION ONCE
Refills: 0 | Status: DISCONTINUED | OUTPATIENT
Start: 2023-09-21 | End: 2023-09-21

## 2023-09-21 RX ORDER — SODIUM CHLORIDE 9 MG/ML
1000 INJECTION INTRAMUSCULAR; INTRAVENOUS; SUBCUTANEOUS ONCE
Refills: 0 | Status: COMPLETED | OUTPATIENT
Start: 2023-09-21 | End: 2023-09-21

## 2023-09-21 RX ADMIN — Medication 10 MILLIGRAM(S): at 21:27

## 2023-09-21 RX ADMIN — Medication 6 MILLIGRAM(S): at 21:27

## 2023-09-21 RX ADMIN — SODIUM CHLORIDE 1000 MILLILITER(S): 9 INJECTION INTRAMUSCULAR; INTRAVENOUS; SUBCUTANEOUS at 21:27

## 2023-09-21 RX ADMIN — Medication 30 MILLIGRAM(S): at 21:54

## 2023-09-21 NOTE — ED STATDOCS - PROGRESS NOTE DETAILS
46 yo female with a PMH of SLE on plaquenil presents with headache and nasal congestion since last night. headache located on the top of the head. Worse with light and sounds. Had headaches in the past, not frequently and usually goes away. Pt states she feels her ear pop when she coughs.   Will treat with meds and reeval. -Ben Lopez PA-C 46 yo female with a PMH of SLE on plaquenil presents with headache and nasal congestion x 5 days. headache located on the top of the head. Worse with light and sounds. Had headaches in the past, not frequently and usually goes away. Pt states she feels her ear pop when she coughs.   Will treat with meds and reeval. -Ben Lopez PA-C Meds and IVF running. Pt mentioned that she has been using saline spray and flonase which has been helping with her symptoms. Advised that she may still have a viral infection and to continue using the flonase, and supportive care with rest, fluids, nsaids/tylenol for pain. No need for abx currently since pt's symptoms have been present for 5 days. Advised to f/u with pmd (DR. Lees) for further evaluation. Pt aware and agrees with plan. -Ben Lopez PA-C

## 2023-09-21 NOTE — ED STATDOCS - RESPIRATORY, MLM
breath sounds clear and equal bilaterally. Detail Level: Zone Samples Given: Aczone qhs 2 samples given

## 2023-09-21 NOTE — ED ADULT NURSE NOTE - NSFALLUNIVINTERV_ED_ALL_ED
Bed/Stretcher in lowest position, wheels locked, appropriate side rails in place/Call bell, personal items and telephone in reach/Instruct patient to call for assistance before getting out of bed/chair/stretcher/Non-slip footwear applied when patient is off stretcher/[place] to call system/Physically safe environment - no spills, clutter or unnecessary equipment/Purposeful proactive rounding/Room/bathroom lighting operational, light cord in reach

## 2023-09-21 NOTE — ED ADULT NURSE NOTE - OBJECTIVE STATEMENT
Pt is A&O x 3, states having headache and head pressure today. Pt denies history of headaches. PMH: Lupus

## 2023-09-21 NOTE — ED STATDOCS - OBJECTIVE STATEMENT
48 y/o female with PMHx of lupus, RA, sciatica, UTI presents to ED c/o headache and nasal congestion since last night. Reports around 2AM last night, pt woke up with a headache and coughing. Took 2 Advil with no relief. Feels bilateral ears are "popped," associated with phonophobia. Endorses mild photophobia. No nausea or vomiting. No fever/chills. No known ill contacts.

## 2023-09-21 NOTE — ED STATDOCS - NSFOLLOWUPINSTRUCTIONS_ED_ALL_ED_FT
Follow up with your primary care doctor for further evaluation of your symptoms.   Continue the flonase and the saline spray to help with the congestion and pain.   Take motrin or tylenol for pain.  Rest and drink plenty of fluid.   Return to the Emergency Department for worsening or persistent symptoms, and/or ANY NEW OR CONCERNING SYMPTOMS. If you have issues obtaining follow up, please call: [phone] or [phone]  to obtain a doctor or specialist who takes your insurance in your area.          Sinus Pain  Outline of a head showing the sinuses.  Sinus pain happens when your sinuses get swollen or blocked (clogged). Sinuses are spaces behind the bones of your face and forehead. You may feel pain or pressure in your face, forehead, ears, or upper teeth. Sinus pain can be mild or very bad.    What are the causes?  Sinus pain can result from conditions that affect your sinuses. Common causes include:  Colds.  Sinus infections.  Allergies.  What are the signs or symptoms?  The main symptom of this condition is pain or pressure in your face, forehead, ears, or upper teeth. People who have sinus pain often have other symptoms, such as:  Stuffed up or runny nose.  Fever.  Not being able to smell.  Headache.  Weather changes can make your symptoms worse.    How is this treated?  Treatment for this condition depends on the cause. Sinus pain caused by:  A sinus infection may be treated with antibiotic medicine.  A stuffy nose may be helped by rinsing out the nose and sinuses with a salt water solution.  Allergies may be helped by allergy medicines and nasal sprays.  Surgery may be needed in some cases if other treatments do not help.    Follow these instructions at home:  General instructions    If told:  Apply a warm, moist washcloth to your face. This can help to lessen pain.  Use a nasal salt water wash. Follow the directions on the bottle or box.  Hydrate and humidify    Drink enough water to keep your pee (urine) pale yellow.  Use a humidifier if your home is dry.  Breathe in steam for 10–15 minutes, 3–4 times a day or as told by your doctor. You can do this in the bathroom while a hot shower is running.  Try not to spend time in cool or dry air.  Medicines    A person using nasal spray.  Take over-the-counter and prescription medicines only as told by your doctor.  If you were prescribed an antibiotic medicine, take it as told by your doctor. Do not stop taking it even if you start to feel better.  Use a nose spray if your nose feels full of mucus (congested).  Contact a doctor if:  You get more than one headache a week.  Light or sound bothers you.  You have a fever.  You feel sick to your stomach (nauseous) or you vomit.  Your headaches do not get better with treatment.  Get help right away if:  You have trouble seeing.  You suddenly have very bad pain in your face or head.  You start to have quick, sudden movements or shaking that you cannot control (seizure).  You are confused.  You have a stiff neck.  Summary  Sinus pain happens when your sinuses get swollen or blocked (clogged). Sinuses are spaces behind the bones of your face and forehead.  You may feel pain or pressure in your face, forehead, ears, or upper teeth.  Take over-the-counter and prescription medicines only as told by your doctor.  If told, apply a warm, moist washcloth to your face. This can help to lessen pain.

## 2023-09-21 NOTE — ED STATDOCS - PATIENT PORTAL LINK FT
You can access the FollowMyHealth Patient Portal offered by [facility] by registering at the following website: [email or web address]. By joining [facility]’s FollowMyHealth portal, you will also be able to view your health information using other applications (apps) compatible with our system.

## 2023-09-21 NOTE — ED STATDOCS - CLINICAL SUMMARY MEDICAL DECISION MAKING FREE TEXT BOX
Patient with likely viral syndrome.  Well after medications.  D/c home in good condition, f/u with PCP, or return to ED if symptoms worsening.

## 2023-09-21 NOTE — ED ADULT TRIAGE NOTE - CHIEF COMPLAINT QUOTE
Pt c/o headache and "ear congestion" x2 days. Denies vision changes, dizziness, chest pain, fevers. Took 400mg Advil 2AM with no relief. PMH Lupus.

## 2023-09-25 ENCOUNTER — APPOINTMENT (OUTPATIENT)
Dept: OBGYN | Facility: CLINIC | Age: 48
End: 2023-09-25
Payer: MEDICAID

## 2023-09-25 ENCOUNTER — NON-APPOINTMENT (OUTPATIENT)
Age: 48
End: 2023-09-25

## 2023-09-25 VITALS
SYSTOLIC BLOOD PRESSURE: 108 MMHG | BODY MASS INDEX: 23.39 KG/M2 | WEIGHT: 132 LBS | HEIGHT: 63 IN | DIASTOLIC BLOOD PRESSURE: 70 MMHG

## 2023-09-25 DIAGNOSIS — R39.9 UNSPECIFIED SYMPTOMS AND SIGNS INVOLVING THE GENITOURINARY SYSTEM: ICD-10-CM

## 2023-09-25 LAB
BILIRUB UR QL STRIP: NORMAL
CLARITY UR: CLEAR
COLLECTION METHOD: NORMAL
GLUCOSE UR-MCNC: NORMAL
HCG UR QL: 0.2 EU/DL
HGB UR QL STRIP.AUTO: NORMAL
KETONES UR-MCNC: NORMAL
LEUKOCYTE ESTERASE UR QL STRIP: NORMAL
NITRITE UR QL STRIP: NORMAL
PH UR STRIP: 5.5
PROT UR STRIP-MCNC: NORMAL
SP GR UR STRIP: 1

## 2023-09-25 PROCEDURE — 99214 OFFICE O/P EST MOD 30 MIN: CPT | Mod: 25

## 2023-09-25 PROCEDURE — 81003 URINALYSIS AUTO W/O SCOPE: CPT | Mod: QW

## 2023-09-27 ENCOUNTER — NON-APPOINTMENT (OUTPATIENT)
Age: 48
End: 2023-09-27

## 2023-09-29 DIAGNOSIS — N89.8 OTHER SPECIFIED NONINFLAMMATORY DISORDERS OF VAGINA: ICD-10-CM

## 2023-10-05 ENCOUNTER — APPOINTMENT (OUTPATIENT)
Dept: DERMATOLOGY | Facility: CLINIC | Age: 48
End: 2023-10-05
Payer: MEDICAID

## 2023-10-05 DIAGNOSIS — L81.4 OTHER MELANIN HYPERPIGMENTATION: ICD-10-CM

## 2023-10-05 DIAGNOSIS — L82.1 OTHER SEBORRHEIC KERATOSIS: ICD-10-CM

## 2023-10-05 DIAGNOSIS — L72.3 SEBACEOUS CYST: ICD-10-CM

## 2023-10-05 PROCEDURE — D0083: CPT

## 2023-10-05 PROCEDURE — 99213 OFFICE O/P EST LOW 20 MIN: CPT | Mod: 25

## 2023-10-05 PROCEDURE — 11401 EXC TR-EXT B9+MARG 0.6-1 CM: CPT

## 2023-10-16 ENCOUNTER — APPOINTMENT (OUTPATIENT)
Dept: DERMATOLOGY | Facility: CLINIC | Age: 48
End: 2023-10-16
Payer: MEDICAID

## 2023-10-16 PROCEDURE — 99024 POSTOP FOLLOW-UP VISIT: CPT

## 2023-10-23 NOTE — ED STATDOCS - RESPIRATORY, MLM
Physical Therapy Visit    Visit Type: Daily Treatment Note  Visit: 7  Referring Provider: Scooter Royal MD  Medical Diagnosis (from order): Diagnosis Information    Diagnosis  N81.89 (ICD-10-CM) - Pelvic floor weakness         SUBJECTIVE                                                                                                               Patient reports use of SI support with increased ease of movement with much less pain.  Patient only used while standing/working.  Patient still reports fear of pain with mobility/movement.    Functional Change: Able to stand/walk easier with use of SI support.      Pain / Symptoms  - Pain rating (out of 10): Current: 2 ; Best: 1; Worst: 4      OBJECTIVE                                                                                                                                    Treatment     Manual Therapy   Prone over 1-2 pillows, STM to right obturator/gemulli muscles followed by PROM right hip ER/IR  STM/skin rolling over well healed right hip lateral incision  Supine - STM to right adductors followed by stretch into hip abduction  Left sidelying - STM to right gluteals, partner hip flexor stretch, knee flexed to 90 degrees, hip in neutral with static hold, no pain, just stretch  Left sidelying trial right IT band stretch - low back pain, DC    Neuromuscular Re-Education  Left sidelying pelvic PNF diagonals with 100 % manual and verbal cues with difficulty lengthening right lateral side  Standing lateral trunk stretch - hands clasped overhead - can do vs the wall to decrease compensatory pelvic rotation, verbal cues to lift up/over the beach ball, completed bilaterally with stretch only, no low back pain - complete bilaterally  Prone over 2 pillows, knee flexion to 90 degrees, P/AAROM hip IR/ER with right hip ER to neutral only, gradual assist with stretch and then worked AAROM  Education with anatomy chart on muscles attachment/function with right hip  AROM      Activities of Daily Living/Self Care  Seated with SI support, cues for core activation, mimic getting in/out of car with hip flexion/abduction with knees     Skilled input: verbal instruction/cues, tactile instruction/cues and as detailed above    Writer verbally educated and received verbal consent for hand placement, positioning of patient, and techniques to be performed today from patient for clothing adjustments for techniques and hand placement and palpation for techniques as described above and how they are pertinent to the patient's plan of care.  Home Exercise Program  Added:    Standing lateral trunk stretch (over beach ball)  Mimic getting in/out of car with SI support  Prone over 1-2 pillows knee flexion with hip IR/ER      ASSESSMENT                                                                                                            Patient with less fear of movement with dissociation of LE from trunk at end of the session for tasks like getting in/out of the car.  Patient still requires verbal and manual cues for neutral lumbar spine with functional tasks as patient falls into hyper lumbar lordosis.  Patient with progression in right hip AROM ER/abduction to assist with getting in/out of the car.    Pain/symptoms after session (out of 10): 0    PLAN                                                                                                                           Suggestions for next session as indicated: Progress per plan of care, progress gluteal strength vs sports cord - walk forward/retro with SI support, core work with neutral lumbar spine, STM as needed       Therapy procedure time and total treatment time can be found documented on the Time Entry flowsheet     breath sounds clear and equal bilaterally.

## 2023-11-07 ENCOUNTER — APPOINTMENT (OUTPATIENT)
Dept: DERMATOLOGY | Facility: CLINIC | Age: 48
End: 2023-11-07

## 2023-12-26 ENCOUNTER — APPOINTMENT (OUTPATIENT)
Dept: INTERNAL MEDICINE | Facility: CLINIC | Age: 48
End: 2023-12-26
Payer: MEDICAID

## 2023-12-26 VITALS
DIASTOLIC BLOOD PRESSURE: 70 MMHG | WEIGHT: 134 LBS | HEART RATE: 63 BPM | OXYGEN SATURATION: 99 % | BODY MASS INDEX: 23.74 KG/M2 | HEIGHT: 63 IN | RESPIRATION RATE: 14 BRPM | TEMPERATURE: 98 F | SYSTOLIC BLOOD PRESSURE: 100 MMHG

## 2023-12-26 DIAGNOSIS — Z00.00 ENCOUNTER FOR GENERAL ADULT MEDICAL EXAMINATION W/OUT ABNORMAL FINDINGS: ICD-10-CM

## 2023-12-26 DIAGNOSIS — Z87.891 PERSONAL HISTORY OF NICOTINE DEPENDENCE: ICD-10-CM

## 2023-12-26 PROCEDURE — 99396 PREV VISIT EST AGE 40-64: CPT

## 2023-12-26 NOTE — PHYSICAL EXAM
[No Acute Distress] : no acute distress [Normal Sclera/Conjunctiva] : normal sclera/conjunctiva [PERRL] : pupils equal round and reactive to light [EOMI] : extraocular movements intact [Normal Oropharynx] : the oropharynx was normal [Normal TMs] : both tympanic membranes were normal [No Lymphadenopathy] : no lymphadenopathy [Supple] : supple [Thyroid Normal, No Nodules] : the thyroid was normal and there were no nodules present [No Respiratory Distress] : no respiratory distress  [No Accessory Muscle Use] : no accessory muscle use [Clear to Auscultation] : lungs were clear to auscultation bilaterally [Normal Rate] : normal rate  [Regular Rhythm] : with a regular rhythm [Normal S1, S2] : normal S1 and S2 [No Murmur] : no murmur heard [No Edema] : there was no peripheral edema [Soft] : abdomen soft [Non Tender] : non-tender [Non-distended] : non-distended [Normal Bowel Sounds] : normal bowel sounds [Normal Posterior Cervical Nodes] : no posterior cervical lymphadenopathy [Normal Anterior Cervical Nodes] : no anterior cervical lymphadenopathy [No Spinal Tenderness] : no spinal tenderness [Grossly Normal Strength/Tone] : grossly normal strength/tone [No Focal Deficits] : no focal deficits [Normal Gait] : normal gait [Deep Tendon Reflexes (DTR)] : deep tendon reflexes were 2+ and symmetric [Normal Affect] : the affect was normal [Normal Insight/Judgement] : insight and judgment were intact

## 2023-12-26 NOTE — HEALTH RISK ASSESSMENT
[Good] : ~his/her~  mood as  good [No] : No [0] : 2) Feeling down, depressed, or hopeless: Not at all (0) [PHQ-2 Negative - No further assessment needed] : PHQ-2 Negative - No further assessment needed [Never] : Never [[id number]] : 0 [Patient reported colonoscopy was normal] : Patient reported colonoscopy was normal [Change in mental status noted] : No change in mental status noted [ColonoscopyDate] : 02/21

## 2023-12-26 NOTE — ASSESSMENT
[FreeTextEntry1] : Rheum: On plaquenil. She follows with rheum and ophtho.  HCM: Up to date on mammogram, pap smear, colonoscopy. Check labs. Will discuss results.

## 2023-12-26 NOTE — REVIEW OF SYSTEMS
[Fever] : no fever [Chills] : no chills [Night Sweats] : no night sweats [Discharge] : no discharge [Vision Problems] : no vision problems [Itching] : no itching [Earache] : no earache [Nasal Discharge] : no nasal discharge [Sore Throat] : no sore throat [Chest Pain] : no chest pain [Palpitations] : no palpitations [Lower Ext Edema] : no lower extremity edema [Shortness Of Breath] : no shortness of breath [Wheezing] : no wheezing [Cough] : no cough [Dyspnea on Exertion] : no dyspnea on exertion [Abdominal Pain] : no abdominal pain [Nausea] : no nausea [Constipation] : no constipation [Diarrhea] : diarrhea [Vomiting] : no vomiting [Melena] : no melena [Dysuria] : no dysuria [Muscle Weakness] : no muscle weakness [Muscle Pain] : no muscle pain [Skin Rash] : no skin rash [Headache] : no headache [Dizziness] : no dizziness [Suicidal] : not suicidal [Anxiety] : no anxiety [Depression] : no depression [Easy Bleeding] : no easy bleeding [Easy Bruising] : no easy bruising [Swollen Glands] : no swollen glands

## 2023-12-27 LAB
ALBUMIN SERPL ELPH-MCNC: 4.5 G/DL
ALP BLD-CCNC: 62 U/L
ALT SERPL-CCNC: 12 U/L
ANION GAP SERPL CALC-SCNC: 12 MMOL/L
AST SERPL-CCNC: 12 U/L
BILIRUB SERPL-MCNC: 0.5 MG/DL
BUN SERPL-MCNC: 17 MG/DL
CALCIUM SERPL-MCNC: 9.1 MG/DL
CHLORIDE SERPL-SCNC: 104 MMOL/L
CHOLEST SERPL-MCNC: 161 MG/DL
CO2 SERPL-SCNC: 24 MMOL/L
CREAT SERPL-MCNC: 0.76 MG/DL
EGFR: 97 ML/MIN/1.73M2
ESTIMATED AVERAGE GLUCOSE: 100 MG/DL
FERRITIN SERPL-MCNC: 77 NG/ML
FOLATE SERPL-MCNC: 14.2 NG/ML
GLUCOSE SERPL-MCNC: 78 MG/DL
HBA1C MFR BLD HPLC: 5.1 %
HCT VFR BLD CALC: 42.6 %
HDLC SERPL-MCNC: 55 MG/DL
HGB BLD-MCNC: 14.1 G/DL
IRON SATN MFR SERPL: 36 %
IRON SERPL-MCNC: 102 UG/DL
LDLC SERPL CALC-MCNC: 95 MG/DL
MCHC RBC-ENTMCNC: 30.6 PG
MCHC RBC-ENTMCNC: 33.1 GM/DL
MCV RBC AUTO: 92.4 FL
NONHDLC SERPL-MCNC: 106 MG/DL
PLATELET # BLD AUTO: 166 K/UL
POTASSIUM SERPL-SCNC: 4.3 MMOL/L
PROT SERPL-MCNC: 7 G/DL
RBC # BLD: 4.61 M/UL
RBC # FLD: 12 %
SODIUM SERPL-SCNC: 139 MMOL/L
T3 SERPL-MCNC: 105 NG/DL
T4 FREE SERPL-MCNC: 1.1 NG/DL
TIBC SERPL-MCNC: 282 UG/DL
TRIGL SERPL-MCNC: 53 MG/DL
TSH SERPL-ACNC: 0.64 UIU/ML
UIBC SERPL-MCNC: 180 UG/DL
VIT B12 SERPL-MCNC: 382 PG/ML
WBC # FLD AUTO: 4.41 K/UL

## 2023-12-28 ENCOUNTER — APPOINTMENT (OUTPATIENT)
Dept: DERMATOLOGY | Facility: CLINIC | Age: 48
End: 2023-12-28
Payer: SELF-PAY

## 2023-12-28 DIAGNOSIS — Z41.1 ENCOUNTER FOR COSMETIC SURGERY: ICD-10-CM

## 2023-12-28 PROCEDURE — ZZZZZ: CPT

## 2024-01-10 ENCOUNTER — APPOINTMENT (OUTPATIENT)
Dept: OBGYN | Facility: CLINIC | Age: 49
End: 2024-01-10
Payer: MEDICAID

## 2024-01-10 VITALS
WEIGHT: 134 LBS | SYSTOLIC BLOOD PRESSURE: 98 MMHG | DIASTOLIC BLOOD PRESSURE: 60 MMHG | HEIGHT: 63 IN | BODY MASS INDEX: 23.74 KG/M2

## 2024-01-10 DIAGNOSIS — N89.8 OTHER SPECIFIED NONINFLAMMATORY DISORDERS OF VAGINA: ICD-10-CM

## 2024-01-10 DIAGNOSIS — R92.30 DENSE BREASTS, UNSPECIFIED: ICD-10-CM

## 2024-01-10 DIAGNOSIS — Z01.419 ENCOUNTER FOR GYNECOLOGICAL EXAMINATION (GENERAL) (ROUTINE) W/OUT ABNORMAL FINDINGS: ICD-10-CM

## 2024-01-10 PROCEDURE — 99396 PREV VISIT EST AGE 40-64: CPT

## 2024-01-10 RX ORDER — ESTRADIOL 0.1 MG/G
0.1 CREAM VAGINAL
Qty: 1 | Refills: 3 | Status: ACTIVE | COMMUNITY
Start: 2024-01-10 | End: 1900-01-01

## 2024-01-10 NOTE — COUNSELING
[Nutrition/ Exercise/ Weight Management] : nutrition, exercise, weight management [Medication Management] : medication management [Vitamins/Supplements] : vitamins/supplements

## 2024-01-10 NOTE — DISCUSSION/SUMMARY
[FreeTextEntry1] : Impression: Vaginal dryness, decreased libido, IUD in place, dense breasts otherwise normal GYN exam  Recommendations: Self breast exam, mammography and breast sonogram, vitamin supplementation regular exercise, hormonal lab evaluation and follow-up consultation to discuss test results and treatment options  Rx: Estradiol vaginal cream 0.5 g twice per week vaginally-risk benefits and alternatives reviewed  Instructions and precautions reviewed follow-up in 1 year, further treatment pending test results

## 2024-01-10 NOTE — HISTORY OF PRESENT ILLNESS
[FreeTextEntry1] : Patient is a 48-year-old female who presents for a routine annual gynecologic examination.  Patient has complaints of vaginal dryness and decreased libido.  Discussed this issue with patient in detail advised hormonal lab evaluation and follow-up consultation to discuss results and treatment options.  Patient also advised to use vaginal estradiol cream and personal lubricant.  Patient's last mammogram was January 2023 patient has dense breasts.  Patient has an IUD in place Mirena

## 2024-01-10 NOTE — PHYSICAL EXAM
[Appropriately responsive] : appropriately responsive [Alert] : alert [No Acute Distress] : no acute distress [No Lymphadenopathy] : no lymphadenopathy [Regular Rate Rhythm] : regular rate rhythm [No Murmurs] : no murmurs [Soft] : soft [Clear to Auscultation B/L] : clear to auscultation bilaterally [Non-tender] : non-tender [Non-distended] : non-distended [No Lesions] : no lesions [No HSM] : No HSM [No Mass] : no mass [Oriented x3] : oriented x3 [Labia Majora] : normal [Labia Minora] : normal [IUD String] : an IUD string was noted [Normal] : normal [Uterine Adnexae] : normal [No Tenderness] : no tenderness [Nl Sphincter Tone] : normal sphincter tone [FreeTextEntry5] : Pap done [FreeTextEntry9] : Hemoccult negative

## 2024-01-11 LAB — HPV HIGH+LOW RISK DNA PNL CVX: NOT DETECTED

## 2024-01-11 RX ORDER — ESTRADIOL 0.1 MG/G
0.1 CREAM VAGINAL
Qty: 1 | Refills: 3 | Status: ACTIVE | COMMUNITY
Start: 2024-01-11 | End: 1900-01-01

## 2024-01-12 LAB — CYTOLOGY CVX/VAG DOC THIN PREP: ABNORMAL

## 2024-02-02 ENCOUNTER — RESULT REVIEW (OUTPATIENT)
Age: 49
End: 2024-02-02

## 2024-02-02 ENCOUNTER — APPOINTMENT (OUTPATIENT)
Dept: ULTRASOUND IMAGING | Facility: CLINIC | Age: 49
End: 2024-02-02
Payer: MEDICAID

## 2024-02-02 ENCOUNTER — APPOINTMENT (OUTPATIENT)
Dept: MAMMOGRAPHY | Facility: CLINIC | Age: 49
End: 2024-02-02
Payer: MEDICAID

## 2024-02-02 ENCOUNTER — OUTPATIENT (OUTPATIENT)
Dept: OUTPATIENT SERVICES | Facility: HOSPITAL | Age: 49
LOS: 1 days | End: 2024-02-02
Payer: MEDICAID

## 2024-02-02 DIAGNOSIS — Z00.8 ENCOUNTER FOR OTHER GENERAL EXAMINATION: ICD-10-CM

## 2024-02-02 DIAGNOSIS — R92.30 DENSE BREASTS, UNSPECIFIED: ICD-10-CM

## 2024-02-02 PROCEDURE — 76641 ULTRASOUND BREAST COMPLETE: CPT | Mod: 26,50

## 2024-02-02 PROCEDURE — 77067 SCR MAMMO BI INCL CAD: CPT

## 2024-02-02 PROCEDURE — 77063 BREAST TOMOSYNTHESIS BI: CPT

## 2024-02-02 PROCEDURE — 77063 BREAST TOMOSYNTHESIS BI: CPT | Mod: 26

## 2024-02-02 PROCEDURE — 76641 ULTRASOUND BREAST COMPLETE: CPT

## 2024-02-02 PROCEDURE — 77067 SCR MAMMO BI INCL CAD: CPT | Mod: 26

## 2024-02-05 LAB
ALBUMIN SERPL ELPH-MCNC: 4.3 G/DL
ALP BLD-CCNC: 58 U/L
ALT SERPL-CCNC: 14 U/L
AST SERPL-CCNC: 11 U/L
BILIRUB DIRECT SERPL-MCNC: 0.2 MG/DL
BILIRUB INDIRECT SERPL-MCNC: 0.4 MG/DL
BILIRUB SERPL-MCNC: 0.6 MG/DL
ESTRADIOL SERPL-MCNC: 127 PG/ML
FSH SERPL-MCNC: 7.5 IU/L
PROT SERPL-MCNC: 6.7 G/DL
TSH SERPL-ACNC: 1.14 UIU/ML

## 2024-02-07 LAB
TESTOST FREE SERPL-MCNC: 1 PG/ML
TESTOST SERPL-MCNC: 9.4 NG/DL

## 2024-02-12 LAB — DHEA-SULFATE, SERUM: 91 UG/DL

## 2024-02-20 ENCOUNTER — RX RENEWAL (OUTPATIENT)
Age: 49
End: 2024-02-20

## 2024-03-13 ENCOUNTER — APPOINTMENT (OUTPATIENT)
Dept: OBGYN | Facility: CLINIC | Age: 49
End: 2024-03-13
Payer: MEDICAID

## 2024-03-13 VITALS — WEIGHT: 134 LBS | BODY MASS INDEX: 23.74 KG/M2 | HEIGHT: 63 IN

## 2024-03-13 DIAGNOSIS — R68.82 DECREASED LIBIDO: ICD-10-CM

## 2024-03-13 DIAGNOSIS — R79.89 OTHER SPECIFIED ABNORMAL FINDINGS OF BLOOD CHEMISTRY: ICD-10-CM

## 2024-03-13 PROCEDURE — 99213 OFFICE O/P EST LOW 20 MIN: CPT

## 2024-03-13 NOTE — DISCUSSION/SUMMARY
[FreeTextEntry1] : Impression: Decreased libido, low testosterone level  Rx: Testosterone 2 mg cream-use as directed  Instructions and precautions reviewed follow-up in 3 months with follow-up lab work

## 2024-03-13 NOTE — COUNSELING
[Nutrition/ Exercise/ Weight Management] : nutrition, exercise, weight management [Vitamins/Supplements] : vitamins/supplements [Medication Management] : medication management [Lab Results] : lab results

## 2024-03-29 RX ORDER — TESTOSTERONE MICRONIZED 100 %
POWDER (GRAM) MISCELLANEOUS
Qty: 1 | Refills: 0 | Status: ACTIVE | COMMUNITY
Start: 2024-03-13 | End: 1900-01-01

## 2024-04-20 ENCOUNTER — APPOINTMENT (OUTPATIENT)
Dept: DERMATOLOGY | Facility: CLINIC | Age: 49
End: 2024-04-20
Payer: MEDICAID

## 2024-04-20 DIAGNOSIS — L71.9 ROSACEA, UNSPECIFIED: ICD-10-CM

## 2024-04-20 DIAGNOSIS — L21.9 SEBORRHEIC DERMATITIS, UNSPECIFIED: ICD-10-CM

## 2024-04-20 PROCEDURE — 99214 OFFICE O/P EST MOD 30 MIN: CPT

## 2024-04-20 RX ORDER — CLINDAMYCIN PHOSPHATE 1 G/10ML
1 GEL TOPICAL
Qty: 30 | Refills: 5 | Status: DISCONTINUED | COMMUNITY
Start: 2018-12-12 | End: 2024-04-20

## 2024-04-20 RX ORDER — METRONIDAZOLE 7.5 MG/G
0.75 GEL VAGINAL
Qty: 1 | Refills: 0 | Status: DISCONTINUED | COMMUNITY
Start: 2023-09-25 | End: 2024-04-20

## 2024-04-20 RX ORDER — METRONIDAZOLE 7.5 MG/G
0.75 GEL VAGINAL
Qty: 1 | Refills: 0 | Status: DISCONTINUED | COMMUNITY
Start: 2022-02-01 | End: 2024-04-20

## 2024-04-20 RX ORDER — TRIAMCINOLONE ACETONIDE 1 MG/G
0.1 OINTMENT TOPICAL
Qty: 1 | Refills: 0 | Status: DISCONTINUED | COMMUNITY
Start: 2023-09-29 | End: 2024-04-20

## 2024-04-20 RX ORDER — SULFACETAMIDE SODIUM AND SULFUR 10; 5 MG/G; MG/G
10-5 RINSE TOPICAL
Qty: 1 | Refills: 3 | Status: ACTIVE | COMMUNITY
Start: 2024-04-20 | End: 1900-01-01

## 2024-04-20 RX ORDER — CLINDAMYCIN PHOSPHATE 10 MG/ML
1 LOTION TOPICAL
Qty: 60 | Refills: 5 | Status: ACTIVE | COMMUNITY
Start: 2024-04-20 | End: 1900-01-01

## 2024-04-20 NOTE — HISTORY OF PRESENT ILLNESS
[de-identified] : The patient has been fit in for urgent appointment.  c/o irritation, burning, breakouts on face;  was using clindamycin gel, switched to lotion from ligia d/c'd tretinoin 2' irritation

## 2024-04-20 NOTE — ASSESSMENT
[FreeTextEntry1] : Rosacea- flaring, with seb derm overlap;   Therapeutic options and their risks and benefits; along with multiple diagnostic possibilities were discussed at length; risks and benefits of further study were discussed;  Cannot take doxy 2' allergy  Add sulfacet 10/5 wash, continue clindamycin but switch Rx to lotion f/u if fails to improve over next 3-4 weeks

## 2024-05-03 ENCOUNTER — EMERGENCY (EMERGENCY)
Facility: HOSPITAL | Age: 49
LOS: 0 days | Discharge: HOME | End: 2024-05-04
Attending: STUDENT IN AN ORGANIZED HEALTH CARE EDUCATION/TRAINING PROGRAM
Payer: MEDICAID

## 2024-05-03 VITALS
DIASTOLIC BLOOD PRESSURE: 62 MMHG | SYSTOLIC BLOOD PRESSURE: 125 MMHG | RESPIRATION RATE: 18 BRPM | OXYGEN SATURATION: 100 % | HEIGHT: 63 IN | WEIGHT: 130.07 LBS | HEART RATE: 66 BPM | TEMPERATURE: 98 F

## 2024-05-03 DIAGNOSIS — Z88.1 ALLERGY STATUS TO OTHER ANTIBIOTIC AGENTS STATUS: ICD-10-CM

## 2024-05-03 DIAGNOSIS — H92.09 OTALGIA, UNSPECIFIED EAR: ICD-10-CM

## 2024-05-03 DIAGNOSIS — Z88.8 ALLERGY STATUS TO OTHER DRUGS, MEDICAMENTS AND BIOLOGICAL SUBSTANCES: ICD-10-CM

## 2024-05-03 DIAGNOSIS — Z20.822 CONTACT WITH AND (SUSPECTED) EXPOSURE TO COVID-19: ICD-10-CM

## 2024-05-03 DIAGNOSIS — J02.9 ACUTE PHARYNGITIS, UNSPECIFIED: ICD-10-CM

## 2024-05-03 DIAGNOSIS — R05.3 CHRONIC COUGH: ICD-10-CM

## 2024-05-03 PROCEDURE — 0241U: CPT

## 2024-05-03 PROCEDURE — 99283 EMERGENCY DEPT VISIT LOW MDM: CPT | Mod: 25

## 2024-05-03 PROCEDURE — 71045 X-RAY EXAM CHEST 1 VIEW: CPT

## 2024-05-03 PROCEDURE — 99284 EMERGENCY DEPT VISIT MOD MDM: CPT

## 2024-05-03 NOTE — ED PROVIDER NOTE - CLINICAL SUMMARY MEDICAL DECISION MAKING FREE TEXT BOX
Radha Garcia MD, Attending  ddx includes, but is not limited to the following: uri, viral laryngitis, post nasal drip, low suspicion for otitis media or strep given negative strep test at , + cough, no exudates.   plan: CXR and viral swab (RSV), r/o pna given pt is immunosuppressed from RA medications. Anticipate oupt follow up.   update: see progress notes.   ----

## 2024-05-03 NOTE — ED ADULT NURSE NOTE - OBJECTIVE STATEMENT
pt presents to ED with c/o flu-like symptoms. pt went to urgent care 2 days ago was negative for flu A/B, COVID, and strep. Sent home with amoxicillin but are not helping. Pt c/o dry cough, fatigue, difficulty breathing, difficulty swallowing r/t sore throat. Pain on left side of throat and inside L ear. Denies fever. Cough producing small amounts of yellow sputum. PMHx of lupus.

## 2024-05-03 NOTE — ED PROVIDER NOTE - PHYSICAL EXAMINATION
Radha Garcia MD, Attending  Gen: Well appearing in NAD   Head: NC/AT, TM with cone of light, no bulging, non erythematous oropharynx, no exudates.  Neck: trachea midline  Resp:  No distress, clear to auscultation b/l  Ext: no deformities  Neuro:  A&O appears non focal  Skin:  Warm and dry as visualized  Psych:  Normal affect and mood

## 2024-05-03 NOTE — ED ADULT TRIAGE NOTE - CHIEF COMPLAINT QUOTE
complains of cough, left ear pain, throat pain. has been taking amoxicillin since wednesday prescribed from urgent care with no relief.

## 2024-05-03 NOTE — ED ADULT NURSE NOTE - NSFALLUNIVINTERV_ED_ALL_ED
Bed/Stretcher in lowest position, wheels locked, appropriate side rails in place/Call bell, personal items and telephone in reach/Instruct patient to call for assistance before getting out of bed/chair/stretcher/Non-slip footwear applied when patient is off stretcher/[place] to call system/Physically safe environment - no spills, clutter or unnecessary equipment/Purposeful proactive rounding/Room/bathroom lighting operational, light cord in reach

## 2024-05-03 NOTE — ED PROVIDER NOTE - PATIENT PORTAL LINK FT
You can access the FollowMyHealth Patient Portal offered by [facility] by registering at the following website: [email or web address]. By joining [facility]’s FollowMyHealth portal, you will also be able to view your health information using other applications (apps) compatible with our system.

## 2024-05-03 NOTE — ED PROVIDER NOTE - NSFOLLOWUPINSTRUCTIONS_ED_ALL_ED_FT
** You have cough. Finish your prescribed antibiotic from urgent care.   ** You are prescribed Tessalon for your cough. Take as directed by your pharmacists.   ** Take over the counter Tylenol or Ibuprofen for pain -- follow dosing directions on original bottle.    ** Follow up with your primary care doctor in the next 72 hours.     ** Go to the nearest Emergency Department if you experience any new or concerning symptoms, such as:   - worsening pain  - chest pain  - difficulty breathing  - passing out  - unable to eat or drink  - unable to move or feel part of your body  - fever, chills

## 2024-05-04 VITALS
RESPIRATION RATE: 18 BRPM | DIASTOLIC BLOOD PRESSURE: 69 MMHG | OXYGEN SATURATION: 99 % | HEART RATE: 60 BPM | SYSTOLIC BLOOD PRESSURE: 107 MMHG | TEMPERATURE: 98 F

## 2024-05-04 LAB
FLUAV AG NPH QL: SIGNIFICANT CHANGE UP
FLUBV AG NPH QL: SIGNIFICANT CHANGE UP
RSV RNA NPH QL NAA+NON-PROBE: SIGNIFICANT CHANGE UP
SARS-COV-2 RNA SPEC QL NAA+PROBE: SIGNIFICANT CHANGE UP

## 2024-05-04 PROCEDURE — 71045 X-RAY EXAM CHEST 1 VIEW: CPT | Mod: 26

## 2024-05-04 RX ORDER — ACETAMINOPHEN 500 MG
650 TABLET ORAL ONCE
Refills: 0 | Status: COMPLETED | OUTPATIENT
Start: 2024-05-04 | End: 2024-05-04

## 2024-05-04 RX ORDER — LIDOCAINE 4 G/100G
10 CREAM TOPICAL ONCE
Refills: 0 | Status: COMPLETED | OUTPATIENT
Start: 2024-05-04 | End: 2024-05-04

## 2024-05-04 RX ADMIN — Medication 650 MILLIGRAM(S): at 00:41

## 2024-05-04 RX ADMIN — LIDOCAINE 10 MILLILITER(S): 4 CREAM TOPICAL at 00:42

## 2024-05-09 ENCOUNTER — NON-APPOINTMENT (OUTPATIENT)
Age: 49
End: 2024-05-09

## 2024-05-13 ENCOUNTER — APPOINTMENT (OUTPATIENT)
Dept: INTERNAL MEDICINE | Facility: CLINIC | Age: 49
End: 2024-05-13
Payer: MEDICAID

## 2024-05-13 VITALS
OXYGEN SATURATION: 98 % | DIASTOLIC BLOOD PRESSURE: 70 MMHG | RESPIRATION RATE: 14 BRPM | HEIGHT: 63 IN | SYSTOLIC BLOOD PRESSURE: 110 MMHG | HEART RATE: 60 BPM | WEIGHT: 130 LBS | BODY MASS INDEX: 23.04 KG/M2 | TEMPERATURE: 98.6 F

## 2024-05-13 DIAGNOSIS — M79.672 PAIN IN RIGHT FOOT: ICD-10-CM

## 2024-05-13 DIAGNOSIS — R05.8 OTHER SPECIFIED COUGH: ICD-10-CM

## 2024-05-13 DIAGNOSIS — M79.671 PAIN IN RIGHT FOOT: ICD-10-CM

## 2024-05-13 PROCEDURE — 99214 OFFICE O/P EST MOD 30 MIN: CPT

## 2024-05-13 PROCEDURE — G2211 COMPLEX E/M VISIT ADD ON: CPT | Mod: NC,1L

## 2024-05-13 NOTE — HISTORY OF PRESENT ILLNESS
[Cough] : Cough [Sore Throat] : sore throat [Postnasal Drainage] : postnasal drainage [Dyspnea] : no dyspnea [Wheezing] : no wheezing [Chills] : no chills [Fever] : no fever [FreeTextEntry8] : Went to ED on 5/3 and was sent home with amoxicillin.   Also reports lateral foot pain.

## 2024-05-13 NOTE — PHYSICAL EXAM
[No Acute Distress] : no acute distress [Normal Sclera/Conjunctiva] : normal sclera/conjunctiva [PERRL] : pupils equal round and reactive to light [EOMI] : extraocular movements intact [No Respiratory Distress] : no respiratory distress  [No Accessory Muscle Use] : no accessory muscle use [Clear to Auscultation] : lungs were clear to auscultation bilaterally [Normal Rate] : normal rate  [Regular Rhythm] : with a regular rhythm [Normal S1, S2] : normal S1 and S2 [No Murmur] : no murmur heard [Soft] : abdomen soft [Non Tender] : non-tender [Non-distended] : non-distended [Normal Bowel Sounds] : normal bowel sounds [de-identified] : bony prominences lateral feet, pruritic papules on let forearm.

## 2024-05-13 NOTE — REVIEW OF SYSTEMS
[Fever] : no fever [Chills] : no chills [Night Sweats] : no night sweats [Sore Throat] : sore throat [Chest Pain] : no chest pain [Palpitations] : no palpitations [Lower Ext Edema] : no lower extremity edema [Shortness Of Breath] : no shortness of breath [Wheezing] : no wheezing [Cough] : cough [Dyspnea on Exertion] : no dyspnea on exertion [Abdominal Pain] : no abdominal pain [Nausea] : no nausea [Constipation] : no constipation [Diarrhea] : diarrhea [Vomiting] : no vomiting [Melena] : no melena [Dysuria] : no dysuria

## 2024-05-13 NOTE — HEALTH RISK ASSESSMENT
[0] : 2) Feeling down, depressed, or hopeless: Not at all (0) [PHQ-2 Negative - No further assessment needed] : PHQ-2 Negative - No further assessment needed [[id number]] : 0

## 2024-05-13 NOTE — ASSESSMENT
[FreeTextEntry1] : Allergic cough: CXR reviewed and normal. COVID test, RSV test, Flu test reviewed and negative. Most likely due to allergies. Discussed zyrtec 10mg daily and montelukast 10mg daily. Follow-up if worsening.  Foot pain: Recommended wider shoes and f/u with podiatry if persistent.  Rash: Most likely due to working in the yard yesterday. Does not have appearance of poison ivy.

## 2024-05-29 NOTE — HISTORY OF PRESENT ILLNESS
[FreeTextEntry1] : Patient is a 48-year-old female who presents for a consultation to discuss recent test results.  Patient had complaints of decreased libido.  Patient had hormonal lab evaluation which was all normal with the exception of low testosterone.  In view of patient's symptoms and low serum testosterone levels patient advised to consider testosterone treatment.  Risk benefits and alternatives reviewed and questions answered.  Patient is agreeable.  Patient will be prescribed testosterone 2 mg cream to be applied daily.  Patient to have follow-up visit and lab work in 3 months.  Instructions and precautions reviewed.  20 minutes of face time no

## 2024-06-13 ENCOUNTER — APPOINTMENT (OUTPATIENT)
Dept: INTERNAL MEDICINE | Facility: CLINIC | Age: 49
End: 2024-06-13
Payer: MEDICAID

## 2024-06-13 VITALS
HEIGHT: 63 IN | OXYGEN SATURATION: 99 % | DIASTOLIC BLOOD PRESSURE: 68 MMHG | SYSTOLIC BLOOD PRESSURE: 114 MMHG | HEART RATE: 65 BPM | WEIGHT: 130 LBS | TEMPERATURE: 98.2 F | RESPIRATION RATE: 14 BRPM | BODY MASS INDEX: 23.04 KG/M2

## 2024-06-13 DIAGNOSIS — J02.9 ACUTE PHARYNGITIS, UNSPECIFIED: ICD-10-CM

## 2024-06-13 DIAGNOSIS — J30.9 ALLERGIC RHINITIS, UNSPECIFIED: ICD-10-CM

## 2024-06-13 DIAGNOSIS — M32.9 SYSTEMIC LUPUS ERYTHEMATOSUS, UNSPECIFIED: ICD-10-CM

## 2024-06-13 LAB — S PYO AG SPEC QL IA: NEGATIVE

## 2024-06-13 PROCEDURE — 87880 STREP A ASSAY W/OPTIC: CPT | Mod: QW

## 2024-06-13 PROCEDURE — 99214 OFFICE O/P EST MOD 30 MIN: CPT

## 2024-06-13 RX ORDER — METHYLPREDNISOLONE 4 MG/1
4 TABLET ORAL
Qty: 1 | Refills: 0 | Status: ACTIVE | COMMUNITY
Start: 2024-06-13 | End: 1900-01-01

## 2024-06-13 RX ORDER — AZELASTINE HYDROCHLORIDE 0.5 MG/ML
0.05 SOLUTION/ DROPS OPHTHALMIC TWICE DAILY
Qty: 1 | Refills: 0 | Status: ACTIVE | COMMUNITY
Start: 2024-06-13 | End: 1900-01-01

## 2024-06-13 RX ORDER — AZITHROMYCIN 250 MG/1
250 TABLET, FILM COATED ORAL
Qty: 1 | Refills: 0 | Status: ACTIVE | COMMUNITY
Start: 2024-06-13 | End: 1900-01-01

## 2024-06-13 NOTE — PHYSICAL EXAM
[Normal] : affect was normal and insight and judgment were intact [de-identified] : conjunctival injection

## 2024-06-13 NOTE — ASSESSMENT
[FreeTextEntry1] : allergic rhinitis, s/t, sinusitis states she usually gets better with z-abena and medrol pack  make allergist appt  SLE f/u with rheumatologist continue plaquenil

## 2024-06-13 NOTE — HISTORY OF PRESENT ILLNESS
[FreeTextEntry8] : c/o her throat feeling swollen and itchy eyes and congestion which she also had at the beginning of the allergy season and lasted 2 months. She went to ER at that time because she felt like her throat was closing. It finally went away and now taking singulair and claritin which she continues and now coming back.  she takes plaquenil for lupus.

## 2024-06-19 ENCOUNTER — RX RENEWAL (OUTPATIENT)
Age: 49
End: 2024-06-19

## 2024-06-19 RX ORDER — MONTELUKAST 10 MG/1
10 TABLET, FILM COATED ORAL
Qty: 30 | Refills: 0 | Status: ACTIVE | COMMUNITY
Start: 2024-05-13 | End: 1900-01-01

## 2024-06-26 ENCOUNTER — APPOINTMENT (OUTPATIENT)
Dept: OBGYN | Facility: CLINIC | Age: 49
End: 2024-06-26

## 2024-07-02 ENCOUNTER — APPOINTMENT (OUTPATIENT)
Dept: DERMATOLOGY | Facility: CLINIC | Age: 49
End: 2024-07-02

## 2024-07-11 ENCOUNTER — RX RENEWAL (OUTPATIENT)
Age: 49
End: 2024-07-11

## 2024-07-31 ENCOUNTER — LABORATORY RESULT (OUTPATIENT)
Age: 49
End: 2024-07-31

## 2024-07-31 ENCOUNTER — APPOINTMENT (OUTPATIENT)
Dept: OBGYN | Facility: CLINIC | Age: 49
End: 2024-07-31
Payer: MEDICAID

## 2024-07-31 VITALS
DIASTOLIC BLOOD PRESSURE: 66 MMHG | HEIGHT: 63 IN | WEIGHT: 134 LBS | SYSTOLIC BLOOD PRESSURE: 110 MMHG | BODY MASS INDEX: 23.74 KG/M2

## 2024-07-31 DIAGNOSIS — N90.7 VULVAR CYST: ICD-10-CM

## 2024-07-31 DIAGNOSIS — R30.0 DYSURIA: ICD-10-CM

## 2024-07-31 LAB
BILIRUB UR QL STRIP: NORMAL
CLARITY UR: CLEAR
COLLECTION METHOD: NORMAL
GLUCOSE UR-MCNC: NORMAL
HCG UR QL: 0.2 EU/DL
HGB UR QL STRIP.AUTO: NORMAL
KETONES UR-MCNC: NORMAL
LEUKOCYTE ESTERASE UR QL STRIP: NORMAL
NITRITE UR QL STRIP: NORMAL
PH UR STRIP: 5.5
PROT UR STRIP-MCNC: NORMAL
SP GR UR STRIP: <=1.005

## 2024-07-31 PROCEDURE — 81003 URINALYSIS AUTO W/O SCOPE: CPT | Mod: QW

## 2024-07-31 PROCEDURE — G2211 COMPLEX E/M VISIT ADD ON: CPT | Mod: NC

## 2024-07-31 PROCEDURE — 99214 OFFICE O/P EST MOD 30 MIN: CPT | Mod: 25

## 2024-07-31 NOTE — CHIEF COMPLAINT
[Urgent Visit] : Urgent Visit [FreeTextEntry1] : Patient is a 48-year-old female who presents for an urgent same-day visit with complaints of feeling a lump in the vaginal area and feeling some discomfort with urination.  Discussed this issue with patient will send a UA CNS and vaginal culture.  Urine dip is negative.  Patient denies any fever nausea vomiting diarrhea.

## 2024-07-31 NOTE — PHYSICAL EXAM
[Normal] : uterus [No Bleeding] : there was no active vaginal bleeding [Uterine Adnexae] : were not tender and not enlarged [de-identified] : BB sized left labia minora sebaceous cyst [FreeTextEntry4] : Culture done

## 2024-08-03 ENCOUNTER — TRANSCRIPTION ENCOUNTER (OUTPATIENT)
Age: 49
End: 2024-08-03

## 2024-08-03 LAB
BACTERIA UR CULT: ABNORMAL
CANDIDA VAG CYTO: NOT DETECTED
G VAGINALIS+PREV SP MTYP VAG QL MICRO: NOT DETECTED
T VAGINALIS VAG QL WET PREP: NOT DETECTED

## 2024-08-03 RX ORDER — NITROFURANTOIN (MONOHYDRATE/MACROCRYSTALS) 25; 75 MG/1; MG/1
100 CAPSULE ORAL TWICE DAILY
Qty: 10 | Refills: 0 | Status: ACTIVE | COMMUNITY
Start: 2024-08-03 | End: 1900-01-01

## 2024-08-22 ENCOUNTER — APPOINTMENT (OUTPATIENT)
Dept: PEDIATRIC ALLERGY IMMUNOLOGY | Facility: CLINIC | Age: 49
End: 2024-08-22

## 2024-08-24 NOTE — HISTORY OF PRESENT ILLNESS
[de-identified] : f/u for acne;  using topicals\par has good effect from TCA txs,\par did not do well with po txs; \par currently using clindamycin, tretinoin\par also:  rosacea;  significant flaring recently, now improved; No

## 2024-08-26 ENCOUNTER — APPOINTMENT (OUTPATIENT)
Dept: OTOLARYNGOLOGY | Facility: CLINIC | Age: 49
End: 2024-08-26
Payer: MEDICAID

## 2024-08-26 VITALS
DIASTOLIC BLOOD PRESSURE: 63 MMHG | WEIGHT: 130 LBS | TEMPERATURE: 98.2 F | HEART RATE: 60 BPM | HEIGHT: 63 IN | BODY MASS INDEX: 23.04 KG/M2 | SYSTOLIC BLOOD PRESSURE: 116 MMHG

## 2024-08-26 DIAGNOSIS — J34.89 OTHER SPECIFIED DISORDERS OF NOSE AND NASAL SINUSES: ICD-10-CM

## 2024-08-26 DIAGNOSIS — J34.2 DEVIATED NASAL SEPTUM: ICD-10-CM

## 2024-08-26 DIAGNOSIS — J30.9 ALLERGIC RHINITIS, UNSPECIFIED: ICD-10-CM

## 2024-08-26 DIAGNOSIS — R09.81 NASAL CONGESTION: ICD-10-CM

## 2024-08-26 PROCEDURE — 95004 PERQ TESTS W/ALRGNC XTRCS: CPT

## 2024-08-26 PROCEDURE — 31231 NASAL ENDOSCOPY DX: CPT

## 2024-08-26 PROCEDURE — 99213 OFFICE O/P EST LOW 20 MIN: CPT | Mod: 25

## 2024-08-26 NOTE — HISTORY OF PRESENT ILLNESS
[de-identified] : 48 year old female with Mx of lupus presents for evaluation of sinuses.  within the past 12 months has had 3-4 sinus infections, requiring zpack - infections last for about one month. with forehead, cheek and pressure behind her eyes.  with constant stuffy and runny nose using Flonase as needed for the past 5 years  doing saline sprays during allergy season  has not been allergy tested  has tried mucinex extra strength with no relief  tried Zyrtec for years but recently stopped as she felt her throat started to close up and went to the ED  taking claritin as needed sudafed raises her blood pressure and HR

## 2024-08-26 NOTE — CONSULT LETTER
[FreeTextEntry1] : Dear Dr. DAWN BOLTON  I had the pleasure of evaluating your patient NEPTALI JOYCE, thank you for allowing us to participate in their care. please see full note detailing our visit below. If you have any questions, please do not hesitate to call me and I would be happy to discuss further.   Diego Quiros M.D. Attending Physician,   Department of Otolaryngology - Head and Neck Surgery [facility]  Office: [phone] Fax: [phone]

## 2024-08-26 NOTE — CONSULT LETTER
[FreeTextEntry1] : Dear Dr. DAWN BOLTON  I had the pleasure of evaluating your patient NEPTALI JOYCE, thank you for allowing us to participate in their care. please see full note detailing our visit below. If you have any questions, please do not hesitate to call me and I would be happy to discuss further.   Diego Quiros M.D. Attending Physician,   Department of Otolaryngology - Head and Neck Surgery [facility]  Office: [phone] Fax: [phone]

## 2024-08-26 NOTE — HISTORY OF PRESENT ILLNESS
[de-identified] : 48 year old female with Mx of lupus presents for evaluation of sinuses.  within the past 12 months has had 3-4 sinus infections, requiring zpack - infections last for about one month. with forehead, cheek and pressure behind her eyes.  with constant stuffy and runny nose using Flonase as needed for the past 5 years  doing saline sprays during allergy season  has not been allergy tested  has tried mucinex extra strength with no relief  tried Zyrtec for years but recently stopped as she felt her throat started to close up and went to the ED  taking claritin as needed sudafed raises her blood pressure and HR

## 2024-08-26 NOTE — ASSESSMENT
[FreeTextEntry1] : 48 year old female with Mx of lupus presents for evaluation of her sinuses. On exam, R DNS. No polyps or purulence seen on scope.  - discussed abortive techniques including Sudafed, nasal sprays and washes when symptoms start to persist  - continue Flonase. A topical steroid reduce mucosal swelling, illustrated appropriate use and how to reduce the risk of bleeding  - Nasal irrigation and showed how to use it to maximize effectiveness  - Allergy test performed 08/26/24. Counseled patient at length on pathophysiology all allergies and techniques for avoidance. handouts given. - follow up when she has a sinus infection for further evaluation, can consider other tx options including CT scan of sinuses, sinusitis regiment

## 2024-08-28 ENCOUNTER — RX RENEWAL (OUTPATIENT)
Age: 49
End: 2024-08-28

## 2024-08-30 ENCOUNTER — APPOINTMENT (OUTPATIENT)
Dept: DERMATOLOGY | Facility: CLINIC | Age: 49
End: 2024-08-30
Payer: MEDICAID

## 2024-08-30 DIAGNOSIS — L71.9 ROSACEA, UNSPECIFIED: ICD-10-CM

## 2024-08-30 DIAGNOSIS — L28.0 LICHEN SIMPLEX CHRONICUS: ICD-10-CM

## 2024-08-30 PROCEDURE — 99214 OFFICE O/P EST MOD 30 MIN: CPT

## 2024-08-30 NOTE — ASSESSMENT
[FreeTextEntry1] : rosacea;  improved;  continue clinda Send S/S wash Rx to [facility]   Therapeutic options and their risks and benefits; along with multiple diagnostic possibilities were discussed at length; risks and benefits of further study were discussed;  Scalp;  likely neuropathic;  pt. has herniated disc in neck ice application, education

## 2024-08-30 NOTE — HISTORY OF PRESENT ILLNESS
[de-identified] : f/u for check of face; did well with clinda lotion, but insurance did not cover S/S wash also:  c/o burning on scalp; intermittent

## 2025-01-22 ENCOUNTER — APPOINTMENT (OUTPATIENT)
Dept: OBGYN | Facility: CLINIC | Age: 50
End: 2025-01-22
Payer: MEDICAID

## 2025-01-22 DIAGNOSIS — Z92.0 PERSONAL HISTORY OF CONTRACEPTION: ICD-10-CM

## 2025-01-22 DIAGNOSIS — K64.9 UNSPECIFIED HEMORRHOIDS: ICD-10-CM

## 2025-01-22 DIAGNOSIS — R39.9 UNSPECIFIED SYMPTOMS AND SIGNS INVOLVING THE GENITOURINARY SYSTEM: ICD-10-CM

## 2025-01-22 DIAGNOSIS — Z97.5 PRESENCE OF (INTRAUTERINE) CONTRACEPTIVE DEVICE: ICD-10-CM

## 2025-01-22 DIAGNOSIS — Z01.419 ENCOUNTER FOR GYNECOLOGICAL EXAMINATION (GENERAL) (ROUTINE) W/OUT ABNORMAL FINDINGS: ICD-10-CM

## 2025-01-22 PROCEDURE — 82270 OCCULT BLOOD FECES: CPT

## 2025-01-22 PROCEDURE — 99396 PREV VISIT EST AGE 40-64: CPT | Mod: 25

## 2025-01-22 RX ORDER — HYDROCORTISONE 25 MG/G
2.5 CREAM TOPICAL
Qty: 1 | Refills: 3 | Status: ACTIVE | COMMUNITY
Start: 2025-01-22 | End: 1900-01-01

## 2025-01-23 LAB
APPEARANCE: CLEAR
BILIRUBIN URINE: NEGATIVE
BLOOD URINE: NEGATIVE
CARD LOT #: NORMAL
CARD LOT EXP DATE: NORMAL
COLOR: YELLOW
DATE COLLECTED: NORMAL
DEVELOPER LOT #: NORMAL
DEVELOPER LOT EXP DATE: NORMAL
GLUCOSE QUALITATIVE U: NEGATIVE MG/DL
HEMOCCULT SP1 STL QL: NEGATIVE
HPV HIGH+LOW RISK DNA PNL CVX: NOT DETECTED
KETONES URINE: NEGATIVE MG/DL
LEUKOCYTE ESTERASE URINE: NEGATIVE
NITRITE URINE: NEGATIVE
PH URINE: 6
PROTEIN URINE: NEGATIVE MG/DL
QUALITY CONTROL: YES
SPECIFIC GRAVITY URINE: 1.01
UROBILINOGEN URINE: 0.2 MG/DL

## 2025-01-24 LAB — CYTOLOGY CVX/VAG DOC THIN PREP: ABNORMAL

## 2025-01-25 LAB — BACTERIA UR CULT: NORMAL

## 2025-02-07 ENCOUNTER — RESULT REVIEW (OUTPATIENT)
Age: 50
End: 2025-02-07

## 2025-02-07 ENCOUNTER — APPOINTMENT (OUTPATIENT)
Dept: MAMMOGRAPHY | Facility: CLINIC | Age: 50
End: 2025-02-07
Payer: MEDICAID

## 2025-02-07 ENCOUNTER — APPOINTMENT (OUTPATIENT)
Dept: ULTRASOUND IMAGING | Facility: CLINIC | Age: 50
End: 2025-02-07
Payer: MEDICAID

## 2025-02-07 ENCOUNTER — OUTPATIENT (OUTPATIENT)
Dept: OUTPATIENT SERVICES | Facility: HOSPITAL | Age: 50
LOS: 1 days | End: 2025-02-07
Payer: MEDICAID

## 2025-02-07 DIAGNOSIS — Z92.0 PERSONAL HISTORY OF CONTRACEPTION: ICD-10-CM

## 2025-02-07 DIAGNOSIS — Z01.419 ENCOUNTER FOR GYNECOLOGICAL EXAMINATION (GENERAL) (ROUTINE) WITHOUT ABNORMAL FINDINGS: ICD-10-CM

## 2025-02-07 PROCEDURE — 77063 BREAST TOMOSYNTHESIS BI: CPT

## 2025-02-07 PROCEDURE — 76641 ULTRASOUND BREAST COMPLETE: CPT | Mod: 26,50

## 2025-02-07 PROCEDURE — 77067 SCR MAMMO BI INCL CAD: CPT | Mod: 26

## 2025-02-07 PROCEDURE — 76641 ULTRASOUND BREAST COMPLETE: CPT

## 2025-02-07 PROCEDURE — 77067 SCR MAMMO BI INCL CAD: CPT

## 2025-02-07 PROCEDURE — 77063 BREAST TOMOSYNTHESIS BI: CPT | Mod: 26

## 2025-02-12 ENCOUNTER — EMERGENCY (EMERGENCY)
Facility: HOSPITAL | Age: 50
LOS: 0 days | Discharge: HOME | End: 2025-02-12
Attending: EMERGENCY MEDICINE
Payer: MEDICAID

## 2025-02-12 VITALS
RESPIRATION RATE: 16 BRPM | HEART RATE: 86 BPM | SYSTOLIC BLOOD PRESSURE: 95 MMHG | DIASTOLIC BLOOD PRESSURE: 51 MMHG | OXYGEN SATURATION: 96 % | TEMPERATURE: 99 F

## 2025-02-12 VITALS — HEIGHT: 63 IN

## 2025-02-12 DIAGNOSIS — M32.9 SYSTEMIC LUPUS ERYTHEMATOSUS, UNSPECIFIED: ICD-10-CM

## 2025-02-12 DIAGNOSIS — M06.9 RHEUMATOID ARTHRITIS, UNSPECIFIED: ICD-10-CM

## 2025-02-12 DIAGNOSIS — Z88.2 ALLERGY STATUS TO SULFONAMIDES: ICD-10-CM

## 2025-02-12 DIAGNOSIS — R11.2 NAUSEA WITH VOMITING, UNSPECIFIED: ICD-10-CM

## 2025-02-12 DIAGNOSIS — R05.1 ACUTE COUGH: ICD-10-CM

## 2025-02-12 DIAGNOSIS — J10.1 INFLUENZA DUE TO OTHER IDENTIFIED INFLUENZA VIRUS WITH OTHER RESPIRATORY MANIFESTATIONS: ICD-10-CM

## 2025-02-12 DIAGNOSIS — Z88.1 ALLERGY STATUS TO OTHER ANTIBIOTIC AGENTS: ICD-10-CM

## 2025-02-12 DIAGNOSIS — J02.9 ACUTE PHARYNGITIS, UNSPECIFIED: ICD-10-CM

## 2025-02-12 LAB
ALBUMIN SERPL ELPH-MCNC: 3.7 G/DL — SIGNIFICANT CHANGE UP (ref 3.3–5)
ALP SERPL-CCNC: 48 U/L — SIGNIFICANT CHANGE UP (ref 40–120)
ALT FLD-CCNC: 19 U/L — SIGNIFICANT CHANGE UP (ref 12–78)
ANION GAP SERPL CALC-SCNC: 6 MMOL/L — SIGNIFICANT CHANGE UP (ref 5–17)
AST SERPL-CCNC: 10 U/L — LOW (ref 15–37)
BASOPHILS # BLD AUTO: 0.03 K/UL — SIGNIFICANT CHANGE UP (ref 0–0.2)
BASOPHILS # BLD MANUAL: 0 K/UL — SIGNIFICANT CHANGE UP (ref 0–0.2)
BASOPHILS NFR BLD AUTO: 0.6 % — SIGNIFICANT CHANGE UP (ref 0–2)
BASOPHILS NFR BLD MANUAL: 0 % — SIGNIFICANT CHANGE UP (ref 0–2)
BILIRUB SERPL-MCNC: 0.5 MG/DL — SIGNIFICANT CHANGE UP (ref 0.2–1.2)
BUN SERPL-MCNC: 8 MG/DL — SIGNIFICANT CHANGE UP (ref 7–23)
CALCIUM SERPL-MCNC: 8.7 MG/DL — SIGNIFICANT CHANGE UP (ref 8.5–10.1)
CHLORIDE SERPL-SCNC: 102 MMOL/L — SIGNIFICANT CHANGE UP (ref 96–108)
CK SERPL-CCNC: 59 U/L — SIGNIFICANT CHANGE UP (ref 26–192)
CO2 SERPL-SCNC: 26 MMOL/L — SIGNIFICANT CHANGE UP (ref 22–31)
CREAT SERPL-MCNC: 0.74 MG/DL — SIGNIFICANT CHANGE UP (ref 0.5–1.3)
EGFR: 100 ML/MIN/1.73M2 — SIGNIFICANT CHANGE UP
EOSINOPHIL # BLD AUTO: 0.01 K/UL — SIGNIFICANT CHANGE UP (ref 0–0.5)
EOSINOPHIL # BLD MANUAL: 0 K/UL — SIGNIFICANT CHANGE UP (ref 0–0.5)
EOSINOPHIL NFR BLD AUTO: 0.2 % — SIGNIFICANT CHANGE UP (ref 0–6)
EOSINOPHIL NFR BLD MANUAL: 0 % — SIGNIFICANT CHANGE UP (ref 0–6)
FLUAV AG NPH QL: DETECTED
FLUBV AG NPH QL: SIGNIFICANT CHANGE UP
GLUCOSE SERPL-MCNC: 112 MG/DL — HIGH (ref 70–99)
HCG SERPL-ACNC: <1 MIU/ML — SIGNIFICANT CHANGE UP
HCT VFR BLD CALC: 37.5 % — SIGNIFICANT CHANGE UP (ref 34.5–45)
HGB BLD-MCNC: 13.5 G/DL — SIGNIFICANT CHANGE UP (ref 11.5–15.5)
IMM GRANULOCYTES # BLD AUTO: 0.01 K/UL — SIGNIFICANT CHANGE UP (ref 0–0.07)
IMM GRANULOCYTES NFR BLD AUTO: 0.2 % — SIGNIFICANT CHANGE UP (ref 0–0.9)
LYMPHOCYTES # BLD AUTO: 0.19 K/UL — LOW (ref 1–3.3)
LYMPHOCYTES # BLD MANUAL: 0.53 K/UL — LOW (ref 1–3.3)
LYMPHOCYTES NFR BLD AUTO: 3.6 % — LOW (ref 13–44)
LYMPHOCYTES NFR BLD MANUAL: 10 % — LOW (ref 13–44)
MAGNESIUM SERPL-MCNC: 2 MG/DL — SIGNIFICANT CHANGE UP (ref 1.6–2.6)
MANUAL SMEAR VERIFICATION: SIGNIFICANT CHANGE UP
MCHC RBC-ENTMCNC: 31.6 PG — SIGNIFICANT CHANGE UP (ref 27–34)
MCHC RBC-ENTMCNC: 36 G/DL — SIGNIFICANT CHANGE UP (ref 32–36)
MCV RBC AUTO: 87.8 FL — SIGNIFICANT CHANGE UP (ref 80–100)
MONOCYTES # BLD AUTO: 0.6 K/UL — SIGNIFICANT CHANGE UP (ref 0–0.9)
MONOCYTES # BLD MANUAL: 0.64 K/UL — SIGNIFICANT CHANGE UP (ref 0–0.9)
MONOCYTES NFR BLD AUTO: 11.3 % — SIGNIFICANT CHANGE UP (ref 2–14)
MONOCYTES NFR BLD MANUAL: 12 % — SIGNIFICANT CHANGE UP (ref 2–14)
NEUTROPHILS # BLD AUTO: 4.46 K/UL — SIGNIFICANT CHANGE UP (ref 1.8–7.4)
NEUTROPHILS # BLD MANUAL: 4.13 K/UL — SIGNIFICANT CHANGE UP (ref 1.8–7.4)
NEUTROPHILS NFR BLD AUTO: 84.1 % — HIGH (ref 43–77)
NEUTROPHILS NFR BLD MANUAL: 78 % — HIGH (ref 43–77)
NRBC # BLD AUTO: 0 K/UL — SIGNIFICANT CHANGE UP (ref 0–0)
NRBC # FLD: 0 K/UL — SIGNIFICANT CHANGE UP (ref 0–0)
NRBC BLD AUTO-RTO: 0 /100 WBCS — SIGNIFICANT CHANGE UP (ref 0–0)
PLAT MORPH BLD: NORMAL — SIGNIFICANT CHANGE UP
PLATELET # BLD AUTO: 149 K/UL — LOW (ref 150–400)
PMV BLD: 10.4 FL — SIGNIFICANT CHANGE UP (ref 7–13)
POTASSIUM SERPL-MCNC: 3.6 MMOL/L — SIGNIFICANT CHANGE UP (ref 3.5–5.3)
POTASSIUM SERPL-SCNC: 3.6 MMOL/L — SIGNIFICANT CHANGE UP (ref 3.5–5.3)
PROT SERPL-MCNC: 7.1 GM/DL — SIGNIFICANT CHANGE UP (ref 6–8.3)
RBC # BLD: 4.27 M/UL — SIGNIFICANT CHANGE UP (ref 3.8–5.2)
RBC # FLD: 12.5 % — SIGNIFICANT CHANGE UP (ref 10.3–14.5)
RBC BLD AUTO: NORMAL — SIGNIFICANT CHANGE UP
RSV RNA NPH QL NAA+NON-PROBE: SIGNIFICANT CHANGE UP
SARS-COV-2 RNA SPEC QL NAA+PROBE: SIGNIFICANT CHANGE UP
SODIUM SERPL-SCNC: 134 MMOL/L — LOW (ref 135–145)
WBC # BLD: 5.3 K/UL — SIGNIFICANT CHANGE UP (ref 3.8–10.5)
WBC # FLD AUTO: 5.3 K/UL — SIGNIFICANT CHANGE UP (ref 3.8–10.5)

## 2025-02-12 PROCEDURE — 93010 ELECTROCARDIOGRAM REPORT: CPT

## 2025-02-12 PROCEDURE — 71045 X-RAY EXAM CHEST 1 VIEW: CPT

## 2025-02-12 PROCEDURE — 80053 COMPREHEN METABOLIC PANEL: CPT

## 2025-02-12 PROCEDURE — 84702 CHORIONIC GONADOTROPIN TEST: CPT

## 2025-02-12 PROCEDURE — 0241U: CPT

## 2025-02-12 PROCEDURE — 83735 ASSAY OF MAGNESIUM: CPT

## 2025-02-12 PROCEDURE — 71045 X-RAY EXAM CHEST 1 VIEW: CPT | Mod: 26

## 2025-02-12 PROCEDURE — 99285 EMERGENCY DEPT VISIT HI MDM: CPT | Mod: 25

## 2025-02-12 PROCEDURE — 96374 THER/PROPH/DIAG INJ IV PUSH: CPT | Mod: XU

## 2025-02-12 PROCEDURE — 85025 COMPLETE CBC W/AUTO DIFF WBC: CPT

## 2025-02-12 PROCEDURE — 36415 COLL VENOUS BLD VENIPUNCTURE: CPT

## 2025-02-12 PROCEDURE — 82550 ASSAY OF CK (CPK): CPT

## 2025-02-12 PROCEDURE — 93005 ELECTROCARDIOGRAM TRACING: CPT

## 2025-02-12 RX ORDER — BACTERIOSTATIC SODIUM CHLORIDE 0.9 %
1500 VIAL (ML) INJECTION ONCE
Refills: 0 | Status: COMPLETED | OUTPATIENT
Start: 2025-02-12 | End: 2025-02-12

## 2025-02-12 RX ORDER — KETOROLAC TROMETHAMINE 10 MG
15 TABLET ORAL ONCE
Refills: 0 | Status: DISCONTINUED | OUTPATIENT
Start: 2025-02-12 | End: 2025-02-12

## 2025-02-12 RX ADMIN — Medication 15 MILLIGRAM(S): at 05:44

## 2025-02-12 RX ADMIN — Medication 3000 MILLILITER(S): at 05:44

## 2025-02-12 NOTE — ED ADULT NURSE NOTE - OBJECTIVE STATEMENT
Pt c/o flu-like sx x 2 days. Pt reports fever, body aches unrelieved by Tylenol. Hx of lupus, RA. AO x 4. Ambulatory. Endorsing SOB. Denies CP.

## 2025-02-12 NOTE — ED PROVIDER NOTE - NSFOLLOWUPINSTRUCTIONS_ED_ALL_ED_FT
Take ibuprofen 400 mg every 6-8 hours as needed for fever/pain and Tylenol 650 mg every 6 hours as needed for fever/pain.  You may either take them at the same time or alternate the medications.      Influenza, Adult    Influenza, more commonly known as "the flu," is a viral infection that mainly affects the respiratory tract. The respiratory tract includes organs that help you breathe, such as the lungs, nose, and throat. The flu causes many symptoms similar to the common cold along with high fever and body aches.    The flu spreads easily from person to person (iscontagious). Getting a flu shot (influenza vaccination) every year is the best way to prevent the flu.    What are the causes?  This condition is caused by the influenza virus. You can get the virus by:    Breathing in droplets that are in the air from an infected person's cough or sneeze.  Touching something that has been exposed to the virus (has been contaminated) and then touching your mouth, nose, or eyes.    What increases the risk?  The following factors may make you more likely to get the flu:    Not washing or sanitizing your hands often.  Having close contact with many people during cold and flu season.  Touching your mouth, eyes, or nose without first washing or sanitizing your hands.  Not getting a yearly (annual) flu shot.    You may have a higher risk for the flu, including serious problems such as a lung infection (pneumonia), if you:    Are older than 65.  Are pregnant.  Have a weakened disease-fighting system (immune system). You may have a weakened immune system if you:    Have HIV or AIDS.  Are undergoing chemotherapy.  Are taking medicines that reduce (suppress) the activity of your immune system.  Have a long-term (chronic) illness, such as heart disease, kidney disease, diabetes, or lung disease.  Have a liver disorder.  Are severely overweight (morbidly obese).  Have anemia. This is a condition that affects your red blood cells.  Have asthma.    What are the signs or symptoms?  Symptoms of this condition usually begin suddenly and last 4–14 days. They may include:    Fever and chills.  Headaches, body aches, or muscle aches.  Sore throat.  Cough.  Runny or stuffy (congested) nose.  Chest discomfort.  Poor appetite.  Weakness or fatigue.  Dizziness.  Nausea or vomiting.    How is this diagnosed?  This condition may be diagnosed based on:    Your symptoms and medical history.  A physical exam.   Swabbing your nose or throat and testing the fluid for the influenza virus.    How is this treated?  If the flu is diagnosed early, you can be treated with medicine that can help reduce how severe the illness is and how long it lasts (antiviral medicine). This may be given by mouth (orally) or through an IV.    Taking care of yourself at home can help relieve symptoms. Your health care provider may recommend:    Taking over-the-counter medicines.  Drinking plenty of fluids.    In many cases, the flu goes away on its own. If you have severe symptoms or complications, you may be treated in a hospital.    Follow these instructions at home:      Activity    Rest as needed and get plenty of sleep.  Stay home from work or school as told by your health care provider. Unless you are visiting your health care provider, avoid leaving home until your fever has been gone for 24 hours without taking medicine.        Eating and drinking    Take an oral rehydration solution (ORS). This is a drink that is sold at pharmacies and retail stores.  Drink enough fluid to keep your urine pale yellow.  Drink clear fluids in small amounts as you are able. Clear fluids include water, ice chips, diluted fruit juice, and low-calorie sports drinks.  Eat bland, easy-to-digest foods in small amounts as you are able. These foods include bananas, applesauce, rice, lean meats, toast, and crackers.  Avoid drinking fluids that contain a lot of sugar or caffeine, such as energy drinks, regular sports drinks, and soda.  Avoid alcohol.  Avoid spicy or fatty foods.        General instructions      Take over-the-counter and prescription medicines only as told by your health care provider.  Use a cool mist humidifier to add humidity to the air in your home. This can make it easier to breathe.  Cover your mouth and nose when you cough or sneeze.  Wash your hands with soap and water often, especially after you cough or sneeze. If soap and water are not available, use alcohol-based hand .  Keep all follow-up visits as told by your health care provider. This is important.    How is this prevented?     Get an annual flu shot. You may get the flu shot in late summer, fall, or winter. Ask your health care provider when you should get your flu shot.  Avoid contact with people who are sick during cold and flu season. This is generally fall and winter.    Contact a health care provider if:  You develop new symptoms.  You have:    Chest pain.  Diarrhea.  A fever.  Your cough gets worse.  You produce more mucus.  You feel nauseous or you vomit.    Get help right away if:  You develop shortness of breath or difficulty breathing.  Your skin or nails turn a bluish color.  You have severe pain or stiffness in your neck.  You develop a sudden headache or sudden pain in your face or ear.  You cannot eat or drink without vomiting.    Summary  Influenza, more commonly known as "the flu," is a viral infection that primarily affects your respiratory tract.  Symptoms of the flu usually begin suddenly and last 4–14 days.  Getting an annual flu shot is the best way to prevent getting the flu.  Stay home from work or school as told by your health care provider. Unless you are visiting your health care provider, avoid leaving home until your fever has been gone for 24 hours without taking medicine.  Keep all follow-up visits as told by your health care provider. This is important.    ADDITIONAL NOTES AND INSTRUCTIONS    Please follow up with your Primary MD in 24-48 hr.  Seek immediate medical care for any new/worsening signs or symptoms.

## 2025-02-12 NOTE — ED PROVIDER NOTE - PATIENT PORTAL LINK FT
You can access the FollowMyHealth Patient Portal offered by [facility] by registering at the following website: [email or web address]. By joining [facility]’s FollowMyHealth portal, you will also be able to view your health information using other applications (apps) compatible with our system.

## 2025-02-12 NOTE — ED PROVIDER NOTE - PHYSICAL EXAMINATION
CONSTITUTIONAL: Well appearing, awake, alert, oriented to person, place, time/situation and in no apparent distress.  · ENMT: Airway patent, Nasal mucosa clear. Mouth with normal mucosa. Throat has no vesicles, no oropharyngeal exudates and uvula is midline.    Posterior oropharyngeal erythema  · EYES: Clear bilaterally, pupils equal, round and reactive to light.  · CARDIAC: Normal rate, regular rhythm.  Heart sounds S1, S2.  No murmurs, rubs or gallops.  · RESPIRATORY: Breath sounds clear and equal bilaterally.  · GASTROINTESTINAL: Abdomen soft, non-tender, no guarding.  · MUSCULOSKELETAL: Spine appears normal, range of motion is not limited, no muscle or joint tenderness  · NEUROLOGICAL: Alert and oriented, no focal deficits, no motor or sensory deficits.  · SKIN: Skin normal color for race, warm, dry and intact. No evidence of rash

## 2025-02-12 NOTE — ED PROVIDER NOTE - OBJECTIVE STATEMENT
49-year-old female with history of lupus hydroxychloroquine, rheumatoid arthritis presents for evaluation of 48 hours of arthralgia, myalgia, cough productive of clear/yellow sputum and nausea and vomiting.  Patient also notes sore throat.  Patient denies any abdominal pain.  Patient notes that her urine has cloudy today but denies any dysuria or frequency.  Patient denies any recent travel.  No known sick contacts.  Patient is currently awake, alert and oriented x 3 in no apparent distress.  See physical exam.  Patient's presentation is most viral URI versus   Influenza versus COVID-19.   will get basic labs to include CBC, BMP, liver/COVID/RSV swab chest x-ray to rule out pneumonia IV fluids, Toradol and reassess.

## 2025-02-12 NOTE — ED ADULT TRIAGE NOTE - CHIEF COMPLAINT QUOTE
Pt presents to ED w c/o body aches, fever (tmax 102), chills, unable to eat or drink x2 days. PMH of Lupus and states that this feels like her last flare up. Denies vomiting diarrhea, sick contacts. endorsing sob and cough. Pt A&O4 w clear speech, following commands, respirations even and unlabored, O2 sat 97%, sent for ekg

## 2025-02-12 NOTE — ED PROVIDER NOTE - PROGRESS NOTE DETAILS
results reviewed with the patient.  Patient states that she feels better.  Advised patient that supportive care is the preferred treatment for influenza given the significant side effects associated with oseltamivir.  Will advise patient to continue with Tylenol every 6 hours and ibuprofen every 6 hours for fever and to continue oral hydration.  Abdiel Ramsey, DO

## 2025-02-18 ENCOUNTER — APPOINTMENT (OUTPATIENT)
Dept: INTERNAL MEDICINE | Facility: CLINIC | Age: 50
End: 2025-02-18

## 2025-02-28 ENCOUNTER — RX RENEWAL (OUTPATIENT)
Age: 50
End: 2025-02-28

## 2025-03-24 ENCOUNTER — APPOINTMENT (OUTPATIENT)
Dept: COLORECTAL SURGERY | Facility: CLINIC | Age: 50
End: 2025-03-24

## 2025-04-08 ENCOUNTER — APPOINTMENT (OUTPATIENT)
Dept: INTERNAL MEDICINE | Facility: CLINIC | Age: 50
End: 2025-04-08
Payer: MEDICAID

## 2025-04-08 VITALS
DIASTOLIC BLOOD PRESSURE: 64 MMHG | HEIGHT: 63 IN | WEIGHT: 137 LBS | SYSTOLIC BLOOD PRESSURE: 110 MMHG | TEMPERATURE: 98.7 F | HEART RATE: 64 BPM | RESPIRATION RATE: 14 BRPM | OXYGEN SATURATION: 99 % | BODY MASS INDEX: 24.27 KG/M2

## 2025-04-08 DIAGNOSIS — Z00.00 ENCOUNTER FOR GENERAL ADULT MEDICAL EXAMINATION W/OUT ABNORMAL FINDINGS: ICD-10-CM

## 2025-04-08 DIAGNOSIS — L02.91 CUTANEOUS ABSCESS, UNSPECIFIED: ICD-10-CM

## 2025-04-08 PROCEDURE — 99396 PREV VISIT EST AGE 40-64: CPT

## 2025-04-09 LAB
ALBUMIN SERPL ELPH-MCNC: 4.5 G/DL
ALP BLD-CCNC: 61 U/L
ALT SERPL-CCNC: 14 U/L
ANION GAP SERPL CALC-SCNC: 11 MMOL/L
AST SERPL-CCNC: 12 U/L
BILIRUB SERPL-MCNC: 0.4 MG/DL
BUN SERPL-MCNC: 19 MG/DL
CALCIUM SERPL-MCNC: 9.3 MG/DL
CHLORIDE SERPL-SCNC: 104 MMOL/L
CHOLEST SERPL-MCNC: 185 MG/DL
CO2 SERPL-SCNC: 24 MMOL/L
CREAT SERPL-MCNC: 0.73 MG/DL
EGFRCR SERPLBLD CKD-EPI 2021: 101 ML/MIN/1.73M2
ESTIMATED AVERAGE GLUCOSE: 100 MG/DL
GLUCOSE SERPL-MCNC: 85 MG/DL
HBA1C MFR BLD HPLC: 5.1 %
HCT VFR BLD CALC: 39.8 %
HDLC SERPL-MCNC: 66 MG/DL
HGB BLD-MCNC: 13.1 G/DL
LDLC SERPL-MCNC: 109 MG/DL
MCHC RBC-ENTMCNC: 31 PG
MCHC RBC-ENTMCNC: 32.9 G/DL
MCV RBC AUTO: 94.1 FL
NONHDLC SERPL-MCNC: 119 MG/DL
PLATELET # BLD AUTO: 182 K/UL
POTASSIUM SERPL-SCNC: 4.4 MMOL/L
PROT SERPL-MCNC: 7.4 G/DL
RBC # BLD: 4.23 M/UL
RBC # FLD: 13 %
SODIUM SERPL-SCNC: 139 MMOL/L
T3 SERPL-MCNC: 103 NG/DL
T4 FREE SERPL-MCNC: 1.1 NG/DL
TRIGL SERPL-MCNC: 52 MG/DL
TSH SERPL-ACNC: 0.55 UIU/ML
WBC # FLD AUTO: 6.26 K/UL

## 2025-05-28 ENCOUNTER — NON-APPOINTMENT (OUTPATIENT)
Age: 50
End: 2025-05-28

## 2025-05-28 ENCOUNTER — APPOINTMENT (OUTPATIENT)
Dept: OTOLARYNGOLOGY | Facility: CLINIC | Age: 50
End: 2025-05-28
Payer: MEDICAID

## 2025-05-28 VITALS
SYSTOLIC BLOOD PRESSURE: 108 MMHG | WEIGHT: 130 LBS | HEART RATE: 61 BPM | HEIGHT: 63 IN | BODY MASS INDEX: 23.04 KG/M2 | DIASTOLIC BLOOD PRESSURE: 63 MMHG

## 2025-05-28 DIAGNOSIS — L29.9 PRURITUS, UNSPECIFIED: ICD-10-CM

## 2025-05-28 DIAGNOSIS — H93.8X3 OTHER SPECIFIED DISORDERS OF EAR, BILATERAL: ICD-10-CM

## 2025-05-28 DIAGNOSIS — H90.3 SENSORINEURAL HEARING LOSS, BILATERAL: ICD-10-CM

## 2025-05-28 DIAGNOSIS — H69.93 UNSPECIFIED EUSTACHIAN TUBE DISORDER, BILATERAL: ICD-10-CM

## 2025-05-28 DIAGNOSIS — H60.543 ACUTE ECZEMATOID OTITIS EXTERNA, BILATERAL: ICD-10-CM

## 2025-05-28 DIAGNOSIS — R26.89 OTHER ABNORMALITIES OF GAIT AND MOBILITY: ICD-10-CM

## 2025-05-28 PROCEDURE — 92567 TYMPANOMETRY: CPT

## 2025-05-28 PROCEDURE — 92557 COMPREHENSIVE HEARING TEST: CPT

## 2025-05-28 PROCEDURE — 99214 OFFICE O/P EST MOD 30 MIN: CPT | Mod: 25

## 2025-05-28 RX ORDER — FLUOCINOLONE ACETONIDE 0.25 MG/G
0.03 OINTMENT TOPICAL TWICE DAILY
Qty: 1 | Refills: 4 | Status: ACTIVE | COMMUNITY
Start: 2025-05-28 | End: 1900-01-01

## 2025-05-28 RX ORDER — FLUTICASONE PROPIONATE 50 UG/1
50 SPRAY, METERED NASAL DAILY
Qty: 1 | Refills: 1 | Status: ACTIVE | COMMUNITY
Start: 2025-05-28 | End: 1900-01-01

## 2025-06-03 ENCOUNTER — APPOINTMENT (OUTPATIENT)
Dept: INTERNAL MEDICINE | Facility: CLINIC | Age: 50
End: 2025-06-03
Payer: MEDICAID

## 2025-06-03 VITALS
SYSTOLIC BLOOD PRESSURE: 114 MMHG | OXYGEN SATURATION: 98 % | RESPIRATION RATE: 14 BRPM | DIASTOLIC BLOOD PRESSURE: 66 MMHG | HEART RATE: 68 BPM | WEIGHT: 130 LBS | HEIGHT: 63 IN | BODY MASS INDEX: 23.04 KG/M2 | TEMPERATURE: 98.3 F

## 2025-06-03 DIAGNOSIS — R42 DIZZINESS AND GIDDINESS: ICD-10-CM

## 2025-06-03 DIAGNOSIS — M25.521 PAIN IN RIGHT ELBOW: ICD-10-CM

## 2025-06-03 PROCEDURE — 99213 OFFICE O/P EST LOW 20 MIN: CPT

## 2025-06-03 PROCEDURE — G2211 COMPLEX E/M VISIT ADD ON: CPT | Mod: NC

## 2025-06-03 RX ORDER — METHYLPREDNISOLONE 4 MG/1
4 TABLET ORAL
Qty: 1 | Refills: 0 | Status: ACTIVE | COMMUNITY
Start: 2025-06-03 | End: 1900-01-01

## 2025-06-10 ENCOUNTER — APPOINTMENT (OUTPATIENT)
Dept: INTERNAL MEDICINE | Facility: CLINIC | Age: 50
End: 2025-06-10

## 2025-07-11 ENCOUNTER — APPOINTMENT (OUTPATIENT)
Dept: OTOLARYNGOLOGY | Facility: CLINIC | Age: 50
End: 2025-07-11
Payer: MEDICAID

## 2025-07-11 PROCEDURE — 92537 CALORIC VSTBLR TEST W/REC: CPT

## 2025-07-11 PROCEDURE — 92540 BASIC VESTIBULAR EVALUATION: CPT

## 2025-07-11 PROCEDURE — 92547 SUPPLEMENTAL ELECTRICAL TEST: CPT

## 2025-07-14 ENCOUNTER — NON-APPOINTMENT (OUTPATIENT)
Age: 50
End: 2025-07-14

## 2025-07-18 ENCOUNTER — APPOINTMENT (OUTPATIENT)
Dept: MRI IMAGING | Facility: CLINIC | Age: 50
End: 2025-07-18
Payer: MEDICAID

## 2025-07-18 ENCOUNTER — OUTPATIENT (OUTPATIENT)
Dept: OUTPATIENT SERVICES | Facility: HOSPITAL | Age: 50
LOS: 1 days | End: 2025-07-18
Payer: MEDICAID

## 2025-07-18 DIAGNOSIS — Z91.89 OTHER SPECIFIED PERSONAL RISK FACTORS, NOT ELSEWHERE CLASSIFIED: ICD-10-CM

## 2025-07-18 PROCEDURE — 77049 MRI BREAST C-+ W/CAD BI: CPT | Mod: 26

## 2025-07-18 PROCEDURE — C8908: CPT

## 2025-07-18 PROCEDURE — C8937: CPT

## 2025-07-18 PROCEDURE — A9585: CPT

## 2025-08-21 ENCOUNTER — APPOINTMENT (OUTPATIENT)
Dept: DERMATOLOGY | Facility: CLINIC | Age: 50
End: 2025-08-21
Payer: MEDICAID

## 2025-08-21 DIAGNOSIS — L84 CORNS AND CALLOSITIES: ICD-10-CM

## 2025-08-21 DIAGNOSIS — L82.1 OTHER SEBORRHEIC KERATOSIS: ICD-10-CM

## 2025-08-21 PROCEDURE — 99213 OFFICE O/P EST LOW 20 MIN: CPT
